# Patient Record
Sex: FEMALE | Race: BLACK OR AFRICAN AMERICAN | NOT HISPANIC OR LATINO | Employment: PART TIME | URBAN - METROPOLITAN AREA
[De-identification: names, ages, dates, MRNs, and addresses within clinical notes are randomized per-mention and may not be internally consistent; named-entity substitution may affect disease eponyms.]

---

## 2018-04-16 ENCOUNTER — TELEPHONE (OUTPATIENT)
Dept: OBSTETRICS AND GYNECOLOGY | Facility: CLINIC | Age: 43
End: 2018-04-16

## 2018-04-16 NOTE — TELEPHONE ENCOUNTER
Returned pt call and informed pt that she can come to her appt while on cycle. Pt verbalized understanding

## 2018-04-16 NOTE — TELEPHONE ENCOUNTER
----- Message from Irina Farooq sent at 4/16/2018  9:41 AM CDT -----  Contact: pt            Name of Who is Calling: pt      What is the request in detail: pt is stating she has a appointment on tomorrow and her menstrual cycle came over the weekend. Pt is needing to know can she still be seen      Can the clinic reply by [facility]: no      What Number to Call Back if not in [facility]: [phone]

## 2018-04-17 ENCOUNTER — OFFICE VISIT (OUTPATIENT)
Dept: OBSTETRICS AND GYNECOLOGY | Facility: CLINIC | Age: 43
End: 2018-04-17
Payer: MEDICAID

## 2018-04-17 ENCOUNTER — TELEPHONE (OUTPATIENT)
Dept: OBSTETRICS AND GYNECOLOGY | Facility: CLINIC | Age: 43
End: 2018-04-17

## 2018-04-17 VITALS
SYSTOLIC BLOOD PRESSURE: 132 MMHG | WEIGHT: 160.94 LBS | HEIGHT: 64 IN | BODY MASS INDEX: 27.48 KG/M2 | DIASTOLIC BLOOD PRESSURE: 88 MMHG

## 2018-04-17 DIAGNOSIS — R10.2 FEMALE PELVIC PAIN: ICD-10-CM

## 2018-04-17 DIAGNOSIS — D25.9 UTERINE LEIOMYOMA, UNSPECIFIED LOCATION: ICD-10-CM

## 2018-04-17 DIAGNOSIS — N92.0 MENORRHAGIA WITH REGULAR CYCLE: Primary | ICD-10-CM

## 2018-04-17 LAB
B-HCG UR QL: NEGATIVE
CTP QC/QA: YES

## 2018-04-17 PROCEDURE — 99213 OFFICE O/P EST LOW 20 MIN: CPT | Mod: PBBFAC,PN | Performed by: OBSTETRICS & GYNECOLOGY

## 2018-04-17 PROCEDURE — 99203 OFFICE O/P NEW LOW 30 MIN: CPT | Mod: S$PBB,,, | Performed by: OBSTETRICS & GYNECOLOGY

## 2018-04-17 PROCEDURE — 81025 URINE PREGNANCY TEST: CPT | Mod: PBBFAC,PN | Performed by: OBSTETRICS & GYNECOLOGY

## 2018-04-17 PROCEDURE — 99999 PR PBB SHADOW E&M-EST. PATIENT-LVL III: CPT | Mod: PBBFAC,,, | Performed by: OBSTETRICS & GYNECOLOGY

## 2018-04-17 NOTE — TELEPHONE ENCOUNTER
----- Message from Elzbieta Mendoza sent at 4/17/2018  1:13 PM CDT -----            Name of Who is Calling: jimmie      What is the request in detail: pt. Wants to know is there any other options besides getting hysterectomy. Please call to discuss      Can the clinic reply by [facility]: no      [facility]: [phone]

## 2018-04-17 NOTE — PROGRESS NOTES
Chief Complaint   Patient presents with    Vaginal Bleeding     irregular menstrual       HPI:  43 y.o. female  presents as a new patient    Patient's last menstrual period was 2018.    - Long history of heavy cycles  - Recently required a blood transfusion due to anemia (Hct=23.4) associated with her cycle  - Denies irregular bleeding  - 2017: u/s demonstrated a [id number] uterus with multiple uterine fibroids  - Patient also describes bloating, constipation, and increased urinary frequency    Contraception: None  Pap: No result found, No recent documented pap  Mammogram: No recent documented mamogram    Past Medical History:   Diagnosis Date    Anxiety     Depression     Heart murmur     Hypertension      Past Surgical History:   Procedure Laterality Date     SECTION  ,        Social History   Substance Use Topics    Smoking status: Current Every Day Smoker     Packs/day: 0.50     Years: 15.00     Types: Cigarettes    Smokeless tobacco: Never Used    Alcohol use Yes      Comment: Occassions only      Family History   Problem Relation Age of Onset    Diabetes Mother     Hyperlipidemia Maternal Grandmother     Diabetes Maternal Grandmother     Breast cancer Neg Hx     Colon cancer Neg Hx     Ovarian cancer Neg Hx      OB History    Para Term  AB Living   2 2 2     2   SAB TAB Ectopic Multiple Live Births                  # Outcome Date GA Lbr Edmund/2nd Weight Sex Delivery Anes PTL Lv   2 Term            1 Term                   MEDICATIONS: Reviewed with patient.  ALLERGIES: Patient has no known allergies.     ROS:  Review of Systems   Constitutional: Negative for fever.   Respiratory: Negative for shortness of breath.    Cardiovascular: Negative for chest pain.   Gastrointestinal: Positive for bloating and constipation. Negative for abdominal pain, nausea and vomiting.   Genitourinary: Positive for frequency, menorrhagia and pelvic pain. Negative for  "menstrual problem.   Neurological: Negative for headaches.       PHYSICAL EXAM:    /88   Ht 5' 4" (1.626 m)   Wt 73 kg (160 lb 15 oz)   LMP 03/26/2018   BMI 27.62 kg/m²     Physical Exam:   Constitutional: She is oriented to person, place, and time. She appears well-developed.    HENT:   Head: Normocephalic.       Pulmonary/Chest: Effort normal.        Abdominal: She exhibits mass. There is no hepatosplenomegaly. There is no tenderness.   Large abdominopelvic mass palpable to the umbilicus                 Neurological: She is alert and oriented to person, place, and time.     Psychiatric: She has a normal mood and affect.         ASSESSMENT & PLAN:   Menorrhagia with regular cycle  -     POCT Urine Pregnancy    Uterine leiomyoma, unspecified location    Female pelvic pain      - Large abdominopelvic mass on exam; palpates large than expected from u/s    - Patient counseled on fibroids including prevalence, natural history, and associated symptoms such as heavy menstrual bleeding, irregular bleeding, pain, bloating, and constipation  - Patient counseled on management options for fibroids including   -- Medical management with NSAIDs, combined hormonal contraception such as DepoProvera injections or the Mirena IUD   -- Surgical management with uterine artery embolization or hysterectomy    - Return to clinic for pap and endometrial biopsy    Total visit time was 30 minutes with greater than 50% of time dedicated to counseling.      "

## 2018-04-20 ENCOUNTER — TELEPHONE (OUTPATIENT)
Dept: OBSTETRICS AND GYNECOLOGY | Facility: CLINIC | Age: 43
End: 2018-04-20

## 2018-04-20 NOTE — TELEPHONE ENCOUNTER
Returned pt call and informed pt that she have a follow up visit on 4/24/2018 and that she can discuss other options with  at her visit. Pt verbalized understanding

## 2018-04-24 ENCOUNTER — PROCEDURE VISIT (OUTPATIENT)
Dept: OBSTETRICS AND GYNECOLOGY | Facility: CLINIC | Age: 43
End: 2018-04-24
Payer: MEDICAID

## 2018-04-24 ENCOUNTER — TELEPHONE (OUTPATIENT)
Dept: OBSTETRICS AND GYNECOLOGY | Facility: CLINIC | Age: 43
End: 2018-04-24

## 2018-04-24 VITALS
DIASTOLIC BLOOD PRESSURE: 86 MMHG | HEIGHT: 64 IN | BODY MASS INDEX: 27.36 KG/M2 | SYSTOLIC BLOOD PRESSURE: 140 MMHG | WEIGHT: 160.25 LBS

## 2018-04-24 DIAGNOSIS — Z11.51 ENCOUNTER FOR SCREENING FOR HUMAN PAPILLOMAVIRUS (HPV): ICD-10-CM

## 2018-04-24 DIAGNOSIS — D25.9 UTERINE LEIOMYOMA, UNSPECIFIED LOCATION: ICD-10-CM

## 2018-04-24 DIAGNOSIS — Z12.4 ENCOUNTER FOR SCREENING FOR MALIGNANT NEOPLASM OF CERVIX: ICD-10-CM

## 2018-04-24 DIAGNOSIS — N92.1 MENORRHAGIA WITH IRREGULAR CYCLE: Primary | ICD-10-CM

## 2018-04-24 LAB
B-HCG UR QL: NEGATIVE
CTP QC/QA: YES

## 2018-04-24 PROCEDURE — 88175 CYTOPATH C/V AUTO FLUID REDO: CPT

## 2018-04-24 PROCEDURE — 81025 URINE PREGNANCY TEST: CPT | Mod: PBBFAC,PN | Performed by: OBSTETRICS & GYNECOLOGY

## 2018-04-24 PROCEDURE — 88305 TISSUE EXAM BY PATHOLOGIST: CPT | Performed by: PATHOLOGY

## 2018-04-24 PROCEDURE — 58100 BIOPSY OF UTERUS LINING: CPT | Mod: PBBFAC,PN | Performed by: OBSTETRICS & GYNECOLOGY

## 2018-04-24 PROCEDURE — 99214 OFFICE O/P EST MOD 30 MIN: CPT | Mod: 25,S$PBB,, | Performed by: OBSTETRICS & GYNECOLOGY

## 2018-04-24 PROCEDURE — 88305 TISSUE EXAM BY PATHOLOGIST: CPT | Mod: 26,,, | Performed by: PATHOLOGY

## 2018-04-24 PROCEDURE — 87624 HPV HI-RISK TYP POOLED RSLT: CPT

## 2018-04-24 NOTE — PROCEDURES
Endometrial biopsy  Date/Time: 4/24/2018 3:19 PM  Performed by: EVERETT VILLA  Authorized by: EVERETT VILLA   Local anesthesia used: no    Anesthesia:  Local anesthesia used: no  Patient tolerance: Patient tolerated the procedure well with no immediate complications          Patient counseled on need for procedure to evaluate for endometrial hyperplasia.  Patient counseled on risks of procedure including pain, bleeding, and infections.  Patient acknowledges risks and wishes to proceed.  All questions answered.  Consents signed.    Cervix visualized with speculum.  Cervix prepped with povidine iodine.  Tenaculum applied to anterior aspect of cervix.  Adequate sample obtained with 2 passes of the endometrial pipelle.  Tenaculum removed.  Hemostasis achieved with pressure.  Procedure tolerated well.

## 2018-04-24 NOTE — PROGRESS NOTES
"Chief Complaint   Patient presents with    Procedure     embx       HPI:  43 y.o. female     Patient's last menstrual period was 2018.     - Patient with history of heavy cycles and fibroids  - She returns for endometrial biopsy, pap, and further discuss management options      Pap: No result found, No recent documented pap  Mammogram: /, BIRADS1    Past Medical History:   Diagnosis Date    Anxiety     Depression     Heart murmur     Hypertension      Past Surgical History:   Procedure Laterality Date     SECTION  ,        Social History   Substance Use Topics    Smoking status: Current Every Day Smoker     Packs/day: 0.50     Years: 15.00     Types: Cigarettes    Smokeless tobacco: Never Used    Alcohol use Yes      Comment: Occassions only      Family History   Problem Relation Age of Onset    Diabetes Mother     Hyperlipidemia Maternal Grandmother     Diabetes Maternal Grandmother     Breast cancer Neg Hx     Colon cancer Neg Hx     Ovarian cancer Neg Hx      OB History    Para Term  AB Living   2 2 2     2   SAB TAB Ectopic Multiple Live Births                  # Outcome Date GA Lbr Edmund/2nd Weight Sex Delivery Anes PTL Lv   2 Term            1 Term                   MEDICATIONS: Reviewed with patient.  ALLERGIES: Patient has no known allergies.     ROS:  Review of Systems   Constitutional: Negative for fever.   Respiratory: Negative for shortness of breath.    Cardiovascular: Negative for chest pain.   Gastrointestinal: Positive for bloating and constipation. Negative for abdominal pain, nausea and vomiting.   Genitourinary: Positive for frequency, menorrhagia and pelvic pain. Negative for menstrual problem.   Neurological: Negative for headaches.       PHYSICAL EXAM:    BP (!) 140/86   Ht 5' 4" (1.626 m)   Wt 72.7 kg (160 lb 4.4 oz)   LMP 2018   BMI 27.51 kg/m²     Physical Exam:   Constitutional: She is oriented to person, place, and " time. She appears well-developed.    HENT:   Head: Normocephalic.       Pulmonary/Chest: Effort normal.        Abdominal: She exhibits no mass. There is no hepatosplenomegaly. There is no tenderness.     Genitourinary: Vagina normal. Uterus is enlarged. Uterus is not tender. Cervix is normal. Right adnexum displays no tenderness and no fullness. Left adnexum displays no tenderness and no fullness. No vaginal discharge found. Additional cervical findings: pap smear done  Genitourinary Comments: External genitalia: Normal  Urethra: No tenderness; normal meatus  Bladder: No tenderness  Bimanual exam: Large abdominopelvic mass palpable to umbilicus               Neurological: She is alert and oriented to person, place, and time.     Psychiatric: She has a normal mood and affect.         ASSESSMENT & PLAN:   Menorrhagia with irregular cycle  -     POCT Urine Pregnancy  -     Endometrial biopsy  -     Tissue Specimen To Pathology, Obstetrics/Gynecology    Uterine leiomyoma, unspecified location  -     Endometrial biopsy  -     Tissue Specimen To Pathology, Obstetrics/Gynecology    Encounter for screening for malignant neoplasm of cervix  -     Liquid-based pap smear, screening    Encounter for screening for human papillomavirus (HPV)  -     HPV High Risk Genotypes, PCR      - See separate procedure note for endometrial biopsy    - Pap obtained    - Patient again counseled on fibroids including prevalence, natural history, and associated symptoms such as heavy menstrual bleeding, irregular bleeding, pain, bloating, and constipation  - Patient again counseled on management options for fibroids including   -- Medical management with NSAIDs, combined hormonal contraception such as DepoProvera injections or the Mirena IUD   -- Patient counseled that medical management would help with bleeding but is unlikely to significantly improve her bulk related symptoms   -- Surgical management with uterine artery embolization or  hysterectomy    - Patient will discuss management options with her , but she is leaning toward having a hysterectomy  - Patient counseled on surgical approach (abdominal) and recovery time  - Return to clinic in 2 weeks for preOP appointment    Total visit time was 25 minutes with greater than 50% of time dedicated to counseling.

## 2018-04-24 NOTE — TELEPHONE ENCOUNTER
----- Message from Wandy Rossi sent at 4/24/2018  3:01 PM CDT -----  Contact: CHAYO RENE [[id number]]  Name of Who is Calling: CHAYO RENE [[id number]]      What is the request in detail: Patient states she would like to know if it is still necessary for her to have the MRI.       Can the clinic reply by [facility]:  No      What Number to Call Back if not in [facility]: [phone]

## 2018-04-24 NOTE — TELEPHONE ENCOUNTER
Returned pt call and pt wanted to know if she still need a MRI. As per  he stated that she does not need one. Pt verbalized understanding

## 2018-04-30 LAB
HPV16 AG SPEC QL: NEGATIVE
HPV16+18+H RISK 12 DNA CVX-IMP: NEGATIVE
HPV18 DNA SPEC QL NAA+PROBE: NEGATIVE

## 2018-05-04 PROBLEM — D64.9 ANEMIA: Chronic | Status: ACTIVE | Noted: 2018-05-04

## 2018-05-04 PROBLEM — D58.9 HEMOLYTIC ANEMIA: Chronic | Status: RESOLVED | Noted: 2018-05-04 | Resolved: 2018-05-04

## 2018-05-04 PROBLEM — Z72.0 TOBACCO ABUSE DISORDER: Chronic | Status: ACTIVE | Noted: 2018-05-04

## 2018-05-04 PROBLEM — D58.9 HEMOLYTIC ANEMIA: Chronic | Status: ACTIVE | Noted: 2018-05-04

## 2018-05-07 ENCOUNTER — TELEPHONE (OUTPATIENT)
Dept: OBSTETRICS AND GYNECOLOGY | Facility: CLINIC | Age: 43
End: 2018-05-07

## 2018-05-07 NOTE — LETTER
May 7, 2018    Mercedez Soni  [address] [address]             [facility] [address]  [address] [place] [address]  [address]  [facility] [address]  Phone: [phone]  Fax: [phone] Dear Ms. Soni:    The results of your most recent Pap smear are normal. This means that no cancerous or precancerous cells were seen. We recommend that you come back in 1 year for your annual exam and 3 years for your next Pap smear. Endometrial biopsy was normal.    If you have any questions or concerns, please don't hesitate to call.    Sincerely,        Nickolas Zuleta MD

## 2018-05-08 ENCOUNTER — OFFICE VISIT (OUTPATIENT)
Dept: OBSTETRICS AND GYNECOLOGY | Facility: CLINIC | Age: 43
End: 2018-05-08
Payer: MEDICAID

## 2018-05-08 ENCOUNTER — TELEPHONE (OUTPATIENT)
Dept: OBSTETRICS AND GYNECOLOGY | Facility: CLINIC | Age: 43
End: 2018-05-08

## 2018-05-08 VITALS
HEIGHT: 64 IN | SYSTOLIC BLOOD PRESSURE: 134 MMHG | DIASTOLIC BLOOD PRESSURE: 100 MMHG | WEIGHT: 161.69 LBS | BODY MASS INDEX: 27.6 KG/M2

## 2018-05-08 DIAGNOSIS — N92.1 MENORRHAGIA WITH IRREGULAR CYCLE: ICD-10-CM

## 2018-05-08 DIAGNOSIS — D25.9 UTERINE LEIOMYOMA, UNSPECIFIED LOCATION: Primary | ICD-10-CM

## 2018-05-08 PROCEDURE — 99999 PR PBB SHADOW E&M-EST. PATIENT-LVL III: CPT | Mod: PBBFAC,,, | Performed by: OBSTETRICS & GYNECOLOGY

## 2018-05-08 PROCEDURE — 99214 OFFICE O/P EST MOD 30 MIN: CPT | Mod: S$PBB,,, | Performed by: OBSTETRICS & GYNECOLOGY

## 2018-05-08 PROCEDURE — 99213 OFFICE O/P EST LOW 20 MIN: CPT | Mod: PBBFAC,PN | Performed by: OBSTETRICS & GYNECOLOGY

## 2018-05-08 NOTE — TELEPHONE ENCOUNTER
----- Message from Justyna Burns sent at 5/8/2018  1:39 PM CDT -----  Contact: self [phone]  Her  was not able to come in for the appt at 2:15 today, she said he was the reason for the visit.  So she is wondering if you could print up detailed information for him about the surgery. And other options and if they would shrink with the shot?  Please let her know when she can pick it up.  Thank you!

## 2018-05-08 NOTE — PROGRESS NOTES
Chief Complaint   Patient presents with    Consult       HPI:  43 y.o. female     Patient's last menstrual period was 2018.     - Patient with history of heavy cycles and fibroids    - Exam previously demonstrated a large abdominopelvic mass to the pt's umbilicus  - 2018: MRI demonstrates a 24.5-cm uterus with multiple fibroids, largest measuring 16-cm  - 2018: endometrial biopsy negative for hyperplasia or malignancy    - Patient returns with her  to discuss management options    Pap: 2018, NILM/HPV(-)  Mammogram: /, BIRADS1    Past Medical History:   Diagnosis Date    Anxiety     Depression     Heart murmur     Hemolytic anemia 2018    Hypertension      Past Surgical History:   Procedure Laterality Date     SECTION  ,        Social History   Substance Use Topics    Smoking status: Current Every Day Smoker     Packs/day: 0.50     Years: 15.00     Types: Cigarettes    Smokeless tobacco: Never Used    Alcohol use Yes      Comment: Occassions only      Family History   Problem Relation Age of Onset    Diabetes Mother     Hyperlipidemia Maternal Grandmother     Diabetes Maternal Grandmother     Breast cancer Neg Hx     Colon cancer Neg Hx     Ovarian cancer Neg Hx      OB History    Para Term  AB Living   2 2 2     2   SAB TAB Ectopic Multiple Live Births                  # Outcome Date GA Lbr Edmund/2nd Weight Sex Delivery Anes PTL Lv   2 Term            1 Term                   MEDICATIONS: Reviewed with patient.  ALLERGIES: Patient has no known allergies.     ROS:  Review of Systems   Constitutional: Negative for fever.   Respiratory: Negative for shortness of breath.    Cardiovascular: Negative for chest pain.   Gastrointestinal: Positive for bloating and constipation. Negative for abdominal pain, nausea and vomiting.   Genitourinary: Positive for frequency, menorrhagia and pelvic pain. Negative for menstrual problem.  "  Neurological: Negative for headaches.       PHYSICAL EXAM:    BP (!) 134/100   Ht 5' 4" (1.626 m)   Wt 73.4 kg (161 lb 11.3 oz)   LMP 05/07/2018   BMI 27.76 kg/m²     Physical Exam:   Constitutional: She is oriented to person, place, and time. She appears well-developed.    HENT:   Head: Normocephalic.       Pulmonary/Chest: Effort normal.                      Neurological: She is alert and oriented to person, place, and time.     Psychiatric: She has a normal mood and affect.         ASSESSMENT & PLAN:   Uterine leiomyoma, unspecified location    Menorrhagia with irregular cycle          - Patient again counseled on fibroids including prevalence, natural history, and associated symptoms such as heavy menstrual bleeding, irregular bleeding, pain, bloating, and constipation  - Patient again counseled on management options for fibroids including   -- Medical management with NSAIDs, combined hormonal contraception such as DepoProvera injections or the Mirena IUD   -- Patient counseled that medical management would help with bleeding but is unlikely to significantly improve her bulk related symptoms   -- Surgical management with uterine artery embolization or hysterectomy    - Patient will discuss management options with her , but she is leaning toward having a hysterectomy  - Patient will notify clinic when she decides on a management plan    Total visit time was 25 minutes with greater than 50% of time dedicated to counseling.      "

## 2018-05-11 ENCOUNTER — TELEPHONE (OUTPATIENT)
Dept: OBSTETRICS AND GYNECOLOGY | Facility: CLINIC | Age: 43
End: 2018-05-11

## 2018-05-11 NOTE — TELEPHONE ENCOUNTER
----- Message from Irina Farooq sent at 5/10/2018  2:26 PM CDT -----  Contact: pt            Name of Who is Calling: pt      What is the request in detail: pt is calling in regards to discuss scheduling a surgery date      Can the clinic reply by [facility]: no      What Number to Call Back if not in [facility]: [phone]

## 2018-05-18 ENCOUNTER — TELEPHONE (OUTPATIENT)
Dept: OBSTETRICS AND GYNECOLOGY | Facility: CLINIC | Age: 43
End: 2018-05-18

## 2018-05-18 NOTE — TELEPHONE ENCOUNTER
----- Message from Irina aFrooq sent at 5/18/2018 10:21 AM CDT -----  Contact: pt            Name of Who is Calling: pt      What is the request in detail: pt returning a call      Can the clinic reply by [facility]: no      [facility]: [phone]

## 2018-05-18 NOTE — TELEPHONE ENCOUNTER
----- Message from Kelton Madison sent at 5/17/2018  2:52 PM CDT -----  Contact: CHAYO RENE [[id number]]  Name of Who is Calling: CHAYO RENE [[id number]]      What is the request in detail: Patient would like to follow up on if surgery date was decided on.       Can the clinic reply by [facility]: no      [facility]: [phone]

## 2018-06-05 ENCOUNTER — OFFICE VISIT (OUTPATIENT)
Dept: OBSTETRICS AND GYNECOLOGY | Facility: CLINIC | Age: 43
End: 2018-06-05
Payer: MEDICAID

## 2018-06-05 VITALS
BODY MASS INDEX: 27.74 KG/M2 | DIASTOLIC BLOOD PRESSURE: 96 MMHG | HEIGHT: 64 IN | WEIGHT: 162.5 LBS | SYSTOLIC BLOOD PRESSURE: 150 MMHG

## 2018-06-05 DIAGNOSIS — N92.1 MENORRHAGIA WITH IRREGULAR CYCLE: ICD-10-CM

## 2018-06-05 DIAGNOSIS — D25.9 UTERINE LEIOMYOMA, UNSPECIFIED LOCATION: Primary | ICD-10-CM

## 2018-06-05 DIAGNOSIS — R10.2 FEMALE PELVIC PAIN: ICD-10-CM

## 2018-06-05 PROCEDURE — 99213 OFFICE O/P EST LOW 20 MIN: CPT | Mod: PBBFAC,PN | Performed by: OBSTETRICS & GYNECOLOGY

## 2018-06-05 PROCEDURE — 99499 UNLISTED E&M SERVICE: CPT | Mod: S$PBB,,, | Performed by: OBSTETRICS & GYNECOLOGY

## 2018-06-05 PROCEDURE — 99999 PR PBB SHADOW E&M-EST. PATIENT-LVL III: CPT | Mod: PBBFAC,,, | Performed by: OBSTETRICS & GYNECOLOGY

## 2018-06-05 NOTE — PROGRESS NOTES
Chief Complaint   Patient presents with    Consult     discuss surgery       HPI:  43 y.o. female     Patient's last menstrual period was 2018.     - Patient returns for pre-operative appointment  - She desires hysterectomy for large uterine fibroids with associated pain, bloating, and heavy/irregular bleeding    - Exam previously demonstrated a large abdominopelvic mass to the pt's umbilicus  - 2018: MRI demonstrates a 24.5-cm uterus with multiple fibroids, largest measuring 16-cm  - 2018: endometrial biopsy negative for hyperplasia or malignancy      Pap: 2018, NILM/HPV(-)  Mammogram: /, BIRADS1    Past Medical History:   Diagnosis Date    Anxiety     Depression     Heart murmur     Hemolytic anemia 2018    Hypertension      Past Surgical History:   Procedure Laterality Date     SECTION  ,        Social History   Substance Use Topics    Smoking status: Current Every Day Smoker     Packs/day: 0.50     Years: 15.00     Types: Cigarettes    Smokeless tobacco: Never Used    Alcohol use Yes      Comment: Occassions only      Family History   Problem Relation Age of Onset    Diabetes Mother     Hyperlipidemia Maternal Grandmother     Diabetes Maternal Grandmother     Breast cancer Neg Hx     Colon cancer Neg Hx     Ovarian cancer Neg Hx      OB History    Para Term  AB Living   2 2 2     2   SAB TAB Ectopic Multiple Live Births                  # Outcome Date GA Lbr Edmund/2nd Weight Sex Delivery Anes PTL Lv   2 Term            1 Term                   MEDICATIONS: Reviewed with patient.  ALLERGIES: Patient has no known allergies.     ROS:  Review of Systems   Constitutional: Negative for fever.   Respiratory: Negative for shortness of breath.    Cardiovascular: Negative for chest pain.   Gastrointestinal: Positive for bloating and constipation. Negative for abdominal pain, nausea and vomiting.   Genitourinary: Positive for frequency,  "menorrhagia and pelvic pain. Negative for menstrual problem.   Neurological: Negative for headaches.       PHYSICAL EXAM:    BP (!) 150/96   Ht 5' 4" (1.626 m)   Wt 73.7 kg (162 lb 7.7 oz)   LMP 05/07/2018   BMI 27.89 kg/m²     Physical Exam:   Constitutional: She is oriented to person, place, and time. She appears well-developed.    HENT:   Head: Normocephalic.       Pulmonary/Chest: Effort normal.        Abdominal: She exhibits mass. There is no tenderness.                 Neurological: She is alert and oriented to person, place, and time.     Psychiatric: She has a normal mood and affect.         ASSESSMENT & PLAN:   Uterine leiomyoma, unspecified location  -     Case Request Operating Room: HYSTERECTOMY, TOTAL, ABDOMINAL    Menorrhagia with irregular cycle  -     Case Request Operating Room: HYSTERECTOMY, TOTAL, ABDOMINAL    Female pelvic pain  -     Case Request Operating Room: HYSTERECTOMY, TOTAL, ABDOMINAL      - Abdominal hysterectomy scheduled for 7/2/2018 at 0930    - Patient counseled on surgical approach.  Given uterine size, will proceed with abdominal hysterectomy.  - Patient counseled on ovarian preservation.  Patient counseled, though, that if one or both ovaries look abnormal at time of surgery or if the surgery can no be completed safely without removing one or both ovaries, then one or both ovaries may be removed at the time of surgery  - Patient counseled on the risks of hysterectomy including   -- Pain and post-operative management of pain   -- Bleeding and possible management for operative or post-operative bleeding   -- Infection and possible management for post-operative infections   -- Surgical injury, especially to bowel, bladder, or ureters and possible operative or post-operative management   -- Anesthesia   -- Possible blood transfusion including risks of reaction and infection   -- DVT   -- Death  - Patient acknowledges risks and wishes to proceed with surgery.  All questions " answered.  Consents signed.  - Will schedule patient for pre-anesthesia appt.  - Patient instructed   -- To be NPO after midnight on the evening prior to surgery   -- To arrive at the hospital 2 hours prior to her scheduled case

## 2018-06-26 ENCOUNTER — ANESTHESIA EVENT (OUTPATIENT)
Dept: SURGERY | Facility: OTHER | Age: 43
End: 2018-06-26

## 2018-06-26 ENCOUNTER — HOSPITAL ENCOUNTER (OUTPATIENT)
Dept: PREADMISSION TESTING | Facility: OTHER | Age: 43
Discharge: HOME | End: 2018-06-26
Attending: OBSTETRICS & GYNECOLOGY
Payer: MEDICAID

## 2018-06-26 VITALS
WEIGHT: 168 LBS | OXYGEN SATURATION: 100 % | HEIGHT: 64 IN | DIASTOLIC BLOOD PRESSURE: 81 MMHG | BODY MASS INDEX: 28.68 KG/M2 | TEMPERATURE: 99 F | SYSTOLIC BLOOD PRESSURE: 127 MMHG | HEART RATE: 77 BPM

## 2018-06-26 LAB
ABO + RH BLD: NORMAL
BLD GP AB SCN CELLS X3 SERPL QL: NORMAL

## 2018-06-26 PROCEDURE — 86901 BLOOD TYPING SEROLOGIC RH(D): CPT

## 2018-06-26 PROCEDURE — 36415 COLL VENOUS BLD VENIPUNCTURE: CPT

## 2018-06-26 RX ORDER — ALBUTEROL SULFATE 0.83 MG/ML
2.5 SOLUTION RESPIRATORY (INHALATION)
Status: CANCELLED | OUTPATIENT
Start: 2018-06-26 | End: 2018-06-26

## 2018-06-26 RX ORDER — SODIUM CHLORIDE, SODIUM LACTATE, POTASSIUM CHLORIDE, CALCIUM CHLORIDE 600; 310; 30; 20 MG/100ML; MG/100ML; MG/100ML; MG/100ML
INJECTION, SOLUTION INTRAVENOUS CONTINUOUS
Status: CANCELLED | OUTPATIENT
Start: 2018-06-26

## 2018-06-26 RX ORDER — PREGABALIN 75 MG/1
150 CAPSULE ORAL
Status: ACTIVE | OUTPATIENT
Start: 2018-06-26 | End: 2018-06-27

## 2018-06-26 RX ORDER — FAMOTIDINE 20 MG/1
20 TABLET, FILM COATED ORAL
Status: CANCELLED | OUTPATIENT
Start: 2018-06-26 | End: 2018-06-26

## 2018-06-26 NOTE — DISCHARGE INSTRUCTIONS
PRE-ADMIT TESTING -  [phone]    [address]  [place] [facility]          Your surgery has been scheduled at [facility]. We are pleased to have the opportunity to serve you. For Further Information please call [phone].    On the day of surgery please report to the Information Desk on the 1st floor.    · CONTACT YOUR PHYSICIAN'S OFFICE THE DAY PRIOR TO YOUR SURGERY TO OBTAIN YOUR ARRIVAL TIME.     · The evening before surgery do not eat anything after 9 p.m. ( this includes hard candy, chewing gum and mints).  You may only have GATORADE, POWERADE AND WATER  from 9 p.m. until you leave your home.   DO NOT DRINK ANY LIQUIDS ON THE WAY TO THE HOSPITAL.      SPECIAL MEDICATION INSTRUCTIONS: TAKE medications checked off by the Anesthesiologist on your Medication List.    Angiogram Patients: Take medications as instructed by your physician, including aspirin.     Surgery Patients:    If you take ASPIRIN - Your PHYSICIAN/SURGEON will need to inform you IF/OR when you need to stop taking aspirin prior to your surgery.     Do Not take any medications containing IBUPROFEN.  Do Not Wear any make-up or dark nail polish   (especially eye make-up) to surgery. If you come to surgery with makeup on you will be required to remove the makeup or nail polish.    Do not shave your surgical area at least 5 days prior to your surgery. The surgical prep will be performed at the hospital according to Infection Control regulations.    Leave all valuables at home.   Do Not wear any jewelry or watches, including any metal in body piercings.  Contact Lens must be removed before surgery. Either do not wear the contact lens or bring a case and solution for storage.  Please bring a container for eyeglasses or dentures as required.  Bring any paperwork your physician has provided, such as consent forms,  history and physicals, doctor's orders, etc.   Bring comfortable clothes that are loose fitting to wear upon  discharge. Take into consideration the type of surgery being performed.  Maintain your diet as advised per your physician the day prior to surgery.      Adequate rest the night before surgery is advised.   Park in the Parking lot behind the hospital or in the [facility] across the street from the parking lot. Parking is complimentary.  If you will be discharged the same day as your procedure, please arrange for a responsible adult to drive you home or to accompany you if traveling by taxi.   YOU WILL NOT BE PERMITTED TO DRIVE OR TO LEAVE THE HOSPITAL ALONE AFTER SURGERY.   It is strongly recommended that you arrange for someone to remain with you for the first 24 hrs following your surgery.       Thank you for your cooperation.  The Staff of [facility].        Bathing Instructions                                                                 Please shower the evening before and morning of your procedure with    ANTIBACTERIAL SOAP. ( DIAL, etc )  Concentrate on the surgical area   for at least 3 minutes and rinse completely. Dry off as usual.   Do not use any deodorant, powder, body lotions, perfume, after shave or    cologne.

## 2018-06-26 NOTE — ANESTHESIA PREPROCEDURE EVALUATION
06/26/2018  Mercedez Soni is a 43 y.o., female.    Pre-op Assessment    I have reviewed the Patient Summary Reports.     I have reviewed the Nursing Notes.   I have reviewed the Medications.     Review of Systems  Anesthesia Hx:  No problems with previous Anesthesia  Denies Family Hx of Anesthesia complications.   Denies Personal Hx of Anesthesia complications.   Social:  Smoker    Hematology/Oncology:     Oncology Normal    -- Anemia:   Cardiovascular:   Hypertension    Pulmonary:  Pulmonary Normal    Renal/:  Renal/ Normal     Hepatic/GI:  Hepatic/GI Normal    Musculoskeletal:  Musculoskeletal Normal    Neurological:  Neurology Normal    Endocrine:   Hypothyroidism        Physical Exam  General:  Well nourished    Airway/Jaw/Neck:  Airway Findings: Mouth Opening: Normal Tongue: Normal  General Airway Assessment: Adult  Mallampati: I  TM Distance: Normal, at least 6 cm         Dental:  Dental Findings:              Anesthesia Plan  Type of Anesthesia, risks & benefits discussed:  Anesthesia Type:  general  Patient's Preference:   Intra-op Monitoring Plan: standard ASA monitors  Intra-op Monitoring Plan Comments:   Post Op Pain Control Plan: multimodal analgesia  Post Op Pain Control Plan Comments:   Induction:   IV  Beta Blocker:         Informed Consent: Patient understands risks and agrees with Anesthesia plan.  Questions answered. Anesthesia consent signed with patient.  ASA Score: 2     Day of Surgery Review of History & Physical:    H&P update referred to the surgeon.

## 2018-06-29 ENCOUNTER — TELEPHONE (OUTPATIENT)
Dept: OBSTETRICS AND GYNECOLOGY | Facility: CLINIC | Age: 43
End: 2018-06-29

## 2018-06-29 NOTE — TELEPHONE ENCOUNTER
----- Message from Fadia Freeman sent at 6/29/2018 10:31 AM CDT -----  Regarding: Pending pt    MRN #[id number] surgery is not cleared for ­­­­­07/02/2018 In the event the procedure is not cleared is the surgery deemed Medically Urgent (the MD will carry on with surgery without the authorization)?     If the surgery is deemed Non Medically Urgent will the MD cancel or reschedule the surgery until authorization has been obtained?     Please advise.    Thanks,  Fadia Freeman   P#[phone]

## 2018-07-02 ENCOUNTER — ANESTHESIA (OUTPATIENT)
Dept: SURGERY | Facility: OTHER | Age: 43
End: 2018-07-02

## 2018-07-02 ENCOUNTER — TELEPHONE (OUTPATIENT)
Dept: OBSTETRICS AND GYNECOLOGY | Facility: HOSPITAL | Age: 43
End: 2018-07-02

## 2018-07-02 NOTE — TELEPHONE ENCOUNTER
Spoke with Dr. Zuleta regarding ENA planned for today. Insurance did not approve procedure, needs to be cancelled for today.    Patient notified via phone and instructed that someone from Dr. Zuleta's office will contact her about rescheduling.    Noelle Campbell MD  OBGYN - PGY 3

## 2018-07-03 ENCOUNTER — TELEPHONE (OUTPATIENT)
Dept: OBSTETRICS AND GYNECOLOGY | Facility: CLINIC | Age: 43
End: 2018-07-03

## 2018-07-03 NOTE — TELEPHONE ENCOUNTER
Called pt and informed pt that a new surgery date is set for her on 7/30/2018 at 1pm. Pt stated she will call back to confirm if that is a good day and time for her.

## 2018-07-03 NOTE — TELEPHONE ENCOUNTER
----- Message from Naty Yarbrough MA sent at 7/2/2018  4:45 PM CDT -----    Mercedez Soni   Female, 43 y.o., 1975  Weight:   76.2 kg (168 lb)  Phone:   #M:[phone]; H:[phone]  PCP:   Abelardo Dixon MD  Language:   English  Need Interp:   No  Allergies:   No Known Allergies  Health Maintenance:   Due  Primary Ins.:   MEDICAID  MRN:   [id number]  Pt Comm Pref:   None  Patient Portal:   Active  Next Appt:   None  My Sticky Note:         Patient Calls     Noelle Campbell MD routed conversation to [facility] Staff 8 hours ago (8:21 AM)    MD Mercedez Joseph 8 hours ago (8:19 AM)    Noelle Campbell MD 8 hours ago (8:19 AM)          Spoke with Dr. Zuleta regarding ENA planned for today. Insurance did not approve procedure, needs to be cancelled for today.     Patient notified via phone and instructed that someone from Dr. Zuleta's office will contact her about rescheduling.     Noelle Campbell MD  OBGYN - PGY 3       Documentation

## 2018-07-05 ENCOUNTER — TELEPHONE (OUTPATIENT)
Dept: OBSTETRICS AND GYNECOLOGY | Facility: CLINIC | Age: 43
End: 2018-07-05

## 2018-07-05 DIAGNOSIS — D25.9 UTERINE LEIOMYOMA, UNSPECIFIED LOCATION: Primary | ICD-10-CM

## 2018-07-05 DIAGNOSIS — N92.1 MENORRHAGIA WITH IRREGULAR CYCLE: ICD-10-CM

## 2018-07-05 NOTE — TELEPHONE ENCOUNTER
----- Message from Aaliyah Francois sent at 7/5/2018  9:54 AM CDT -----  Contact: CHAYO RENE [[id number]]            Name of Who is Calling: CHAYO RENE [[id number]]      What is the request in detail: pt state that  She is going to keep her surgery date July 30 @ 1:00,please advise       Can the clinic reply by [facility]: no      What [facility]: [phone]

## 2018-07-27 ENCOUNTER — ANESTHESIA EVENT (OUTPATIENT)
Dept: SURGERY | Facility: OTHER | Age: 43
End: 2018-07-27

## 2018-07-30 ENCOUNTER — TELEPHONE (OUTPATIENT)
Dept: OBSTETRICS AND GYNECOLOGY | Facility: CLINIC | Age: 43
End: 2018-07-30

## 2018-07-30 ENCOUNTER — ANESTHESIA (OUTPATIENT)
Dept: SURGERY | Facility: OTHER | Age: 43
End: 2018-07-30

## 2018-07-30 NOTE — TELEPHONE ENCOUNTER
Returned pt called at 11:00. This is a late entry. Pt stated that she had to reschedule her surgery because she stated that her surgery was not confirmed on Friday. Informed pt that I confirmed appointment when I spoke to her prior to surgery date. Pt stated that she had got a text message confirmation and thought it was changed. Pt stated that she would like to reschedule surgery. Informed pt that message will be forwarded to Dr. Zuleta. Pt verbalized understanding

## 2018-07-30 NOTE — TELEPHONE ENCOUNTER
----- Message from Aaliyah Francois sent at 7/30/2018 10:34 AM CDT -----  Contact: CHAYO RENE [[id number]]            Name of Who is Calling: CHAYO RENE [[id number]]      What is the request in detail: states nobody confirmed her surgery  time for today,please advise     Can the clinic reply by [facility]: no      What [facility][phone]

## 2018-08-16 ENCOUNTER — TELEPHONE (OUTPATIENT)
Dept: OBSTETRICS AND GYNECOLOGY | Facility: CLINIC | Age: 43
End: 2018-08-16

## 2018-08-16 NOTE — TELEPHONE ENCOUNTER
Called pt and asked her if she still wanted to have the surgery done. Pt stated she will call back after she check with her son schedule

## 2018-10-22 ENCOUNTER — OFFICE VISIT (OUTPATIENT)
Dept: OBSTETRICS AND GYNECOLOGY | Facility: CLINIC | Age: 43
End: 2018-10-22
Payer: MEDICAID

## 2018-10-22 VITALS
SYSTOLIC BLOOD PRESSURE: 118 MMHG | WEIGHT: 173.06 LBS | BODY MASS INDEX: 29.55 KG/M2 | DIASTOLIC BLOOD PRESSURE: 82 MMHG | HEIGHT: 64 IN

## 2018-10-22 DIAGNOSIS — D25.9 UTERINE LEIOMYOMA, UNSPECIFIED LOCATION: Primary | ICD-10-CM

## 2018-10-22 DIAGNOSIS — R10.2 FEMALE PELVIC PAIN: ICD-10-CM

## 2018-10-22 DIAGNOSIS — N92.1 MENORRHAGIA WITH IRREGULAR CYCLE: ICD-10-CM

## 2018-10-22 PROCEDURE — 99213 OFFICE O/P EST LOW 20 MIN: CPT | Mod: PBBFAC,PN | Performed by: OBSTETRICS & GYNECOLOGY

## 2018-10-22 PROCEDURE — 99213 OFFICE O/P EST LOW 20 MIN: CPT | Mod: S$PBB,,, | Performed by: OBSTETRICS & GYNECOLOGY

## 2018-10-22 PROCEDURE — 99999 PR PBB SHADOW E&M-EST. PATIENT-LVL III: CPT | Mod: PBBFAC,,, | Performed by: OBSTETRICS & GYNECOLOGY

## 2018-10-22 RX ORDER — SERTRALINE HYDROCHLORIDE 100 MG/1
TABLET, FILM COATED ORAL
COMMUNITY
Start: 2018-10-12 | End: 2019-04-03

## 2018-10-22 RX ORDER — ZOLPIDEM TARTRATE 10 MG/1
TABLET ORAL
Status: ON HOLD | COMMUNITY
Start: 2018-10-12 | End: 2018-11-29 | Stop reason: HOSPADM

## 2018-10-22 NOTE — PROGRESS NOTES
PT HERE WITH FIBROIDS. WAS SCHEDULED FOR SURGERY BUT HAD TECHNICAL DIFFICULTIES.    06/05/18  - Patient returns for pre-operative appointment  - She desires hysterectomy for large uterine fibroids with associated pain, bloating, and heavy/irregular bleeding   - Exam previously demonstrated a large abdominopelvic mass to the pt's umbilicus  - 5/2/2018: MRI demonstrates a 24.5-cm uterus with multiple fibroids, largest measuring 16-cm  - 4/24/2018: endometrial biopsy negative for hyperplasia or malignanc      05/02/20128 MRI  Uterus and ovaries: Markedly enlarged 24.5 x 10 x 16 cm with multiple fibroids.  The largest measures 16 x 9.6 x 6.7.  Fibroids distort the endometrium.  No obvious endometrial mass or fluid.  Neither ovary is identified.  Bladder: Unremarkable.  Soft tissues: Unremarkable.  Lymph nodes: No lymphadenopathy  Vasculature: Unremarkable.  Bones and joints: Unremarkable.  Bowel: Unremarkable.      Impression     1. Markedly enlarged myomatous uterus distorting the endometrial canal.  2. Ovaries are not identified.     ROS:  GENERAL: No fever, chills, fatigability or weight loss.  VULVAR: No pain, no lesions and no itching.  VAGINAL: No relaxation, no itching, no discharge, no abnormal bleeding and no lesions.  ABDOMEN: No abdominal pain. Denies nausea. Denies vomiting. No diarrhea. No constipation  BREAST: Denies pain. No lumps. No discharge.  URINARY: No incontinence, no nocturia, no frequency and no dysuria.  CARDIOVASCULAR: No chest pain. No shortness of breath. No leg cramps.  NEUROLOGICAL: No headaches. No vision changes.  The remainder of the review of systems was negative.    PE:  General Appearance: overweight And Well developed. Well nourished. In no acute distress.  Vulva: Lesions: No.  Urethral Meatus: Normal size. Normal location. No lesions. No prolapse.  Urethra: No masses. No tenderness. No prolapse. No scarring.  Bladder: No masses. No tenderness.  Vagina: Mucosa NI:yes discharge no,  atrophy no, cystocele no or rectocele no.  NARROW PUBIS  Cervix: Lesion: no  Stenotic: no Cervical motion tenderness: no  SMALL CVX.  Uterus: Uterus size: 24 weeks. Support good. Uterus size: Abnormal - IRREGULAR  Adnexa: Masses: No Tenderness: No CDS Nodularity: No  Abdomen: overweight NICHELLE. No tenderness.    PROCEDURES:    PLAN:     DIAGNOSIS:  1. Uterine leiomyoma, unspecified location    2. Menorrhagia with irregular cycle    3. Female pelvic pain        MEDICATIONS & ORDERS:     10 MIN D/W PT ON HYSTERECTOMY OPTIONS.    FOLLOW-UP: With DR MORENO FOR HYSTERECTOMY AT [facility]

## 2018-10-30 ENCOUNTER — OFFICE VISIT (OUTPATIENT)
Dept: OBSTETRICS AND GYNECOLOGY | Facility: CLINIC | Age: 43
End: 2018-10-30
Payer: MEDICAID

## 2018-10-30 VITALS
SYSTOLIC BLOOD PRESSURE: 124 MMHG | HEIGHT: 64 IN | DIASTOLIC BLOOD PRESSURE: 80 MMHG | WEIGHT: 174.63 LBS | BODY MASS INDEX: 29.81 KG/M2

## 2018-10-30 DIAGNOSIS — L68.0 HIRSUTISM: Primary | ICD-10-CM

## 2018-10-30 DIAGNOSIS — D21.9 LEIOMYOMA: ICD-10-CM

## 2018-10-30 PROCEDURE — 99213 OFFICE O/P EST LOW 20 MIN: CPT | Mod: PBBFAC,PN | Performed by: OBSTETRICS & GYNECOLOGY

## 2018-10-30 PROCEDURE — 99999 PR PBB SHADOW E&M-EST. PATIENT-LVL III: CPT | Mod: PBBFAC,,, | Performed by: OBSTETRICS & GYNECOLOGY

## 2018-10-30 PROCEDURE — 99214 OFFICE O/P EST MOD 30 MIN: CPT | Mod: S$PBB,,, | Performed by: OBSTETRICS & GYNECOLOGY

## 2018-10-30 NOTE — PROGRESS NOTES
History & Physical  Gynecology      SUBJECTIVE:     Chief Complaint: Fibroids and Consult       History of Present Illness:  Ms. Soni is a 43 yr old female who presents for counseling re hysterectomy for AUB-L. She reports irregular, heavy cycles with abdominal pain and pressure. An embx was done which was negative. TSH normal. MRI shows enlarged uterus with fibroids. She desires definitive surgical management. Her surgical history is significant for  section x 2. She does smoke and desires to cut back. She is otherwise in good health. She also reports new onset hirsutism.     MRI       Uterus and ovaries: Markedly enlarged 24.5 x 10 x 16 cm with multiple fibroids.  The largest measures 16 x 9.6 x 6.7.  Fibroids distort the endometrium.  No obvious endometrial mass or fluid.  Neither ovary is identified.  Bladder: Unremarkable.  Soft tissues: Unremarkable.  Lymph nodes: No lymphadenopathy  Vasculature: Unremarkable.  Bones and joints: Unremarkable.  Bowel: Unremarkable.       Impression       1. Markedly enlarged myomatous uterus distorting the endometrial canal.  2. Ovaries are not identified.       Endometrial Biopsy:  FINAL PATHOLOGIC DIAGNOSIS  Endometrial biopsy:  Proliferative pattern endometrium.  Negative for hyperplasia and malignancy.      Review of patient's allergies indicates:  No Known Allergies    Past Medical History:   Diagnosis Date    Anxiety     Depression     Fibroids     Heart murmur     Hemolytic anemia 2018    Hypertension     Smoker      Past Surgical History:   Procedure Laterality Date     SECTION  ,      OB History      Para Term  AB Living    2 2 2     2    SAB TAB Ectopic Multiple Live Births            2        Family History   Problem Relation Age of Onset    Diabetes Mother     Hyperlipidemia Maternal Grandmother     Diabetes Maternal Grandmother     Breast cancer Neg Hx     Colon cancer Neg Hx     Ovarian cancer  Neg Hx      Social History     Tobacco Use    Smoking status: Current Every Day Smoker     Packs/day: 0.50     Years: 15.00     Pack years: 7.50     Types: Cigarettes    Smokeless tobacco: Never Used   Substance Use Topics    Alcohol use: Yes     Comment: Occassions only     Drug use: No       Current Outpatient Medications   Medication Sig    amLODIPine (NORVASC) 10 MG tablet Take 1 tablet (10 mg total) by mouth once daily.    lisinopril 10 MG tablet Take 1 tablet (10 mg total) by mouth once daily.    sertraline (ZOLOFT) 100 MG tablet     traZODone (DESYREL) 50 MG tablet Take 1 tablet (50 mg total) by mouth nightly.    zolpidem (AMBIEN) 10 mg Tab      No current facility-administered medications for this visit.          Review of Systems:  Review of Systems   Constitutional: Negative for activity change, fatigue, fever and unexpected weight change.   Respiratory: Negative for cough and shortness of breath.    Cardiovascular: Negative for chest pain and palpitations.   Gastrointestinal: Negative for abdominal pain, constipation, diarrhea and nausea.   Endocrine: Negative for hot flashes.   Genitourinary: Positive for menorrhagia, menstrual problem and pelvic pain. Negative for dyspareunia, dysuria and vaginal discharge.   Musculoskeletal: Negative for back pain.   Skin:  Positive for hair changes.   Neurological: Negative for headaches.   Psychiatric/Behavioral: The patient is not nervous/anxious.    Breast: Negative for nipple discharge       OBJECTIVE:     Physical Exam:  Physical Exam   Constitutional: She is oriented to person, place, and time. She appears well-developed and well-nourished.   HENT:   Head: Normocephalic and atraumatic.   Neck: Normal range of motion. Neck supple.   Cardiovascular: Normal rate, regular rhythm, normal heart sounds and intact distal pulses.   Pulmonary/Chest: Effort normal and breath sounds normal.   Abdominal: Soft. Bowel sounds are normal. There is no tenderness. There  is no guarding.   Genitourinary:   Genitourinary Comments: Patient currently declines internal exam. Will plan for exam at preop appointment   Neurological: She is alert and oriented to person, place, and time.   Skin: Skin is warm and dry.   Psychiatric: She has a normal mood and affect.   Vitals reviewed.        ASSESSMENT:       ICD-10-CM ICD-9-CM    1. Hirsutism L68.0 704.1 Testosterone, free      DHEA-sulfate   2. Leiomyoma D21.9 215.9           Plan:      Discussed treatment of fibroids including expectant, medical and surgical management. Patient desires definitive surgical management.   Discussed risks, benefits and alternatives  Discussed approaches to hysterectomy and given size of uterus recommend total abdominal hysterectomy via midline vertical incision  Discussed ovarian removal vs preservation. Patient opts for preservation.  Will discuss dates for surgery with family and will call clinic to schedule. Will need preop appointment one week prior to surgery   Surgery at this time tentatively planned for 11/ 26/2018.   Advised strict smoking cessation prior to surgery. Pt voiced understanding.  Will order hirsutism labs this visit as well. CBC and type and screen to be done at preop appointment.  RTC prior to surgery for preop     Counseling time: 15 minutes    Eliseo Huber

## 2018-10-31 ENCOUNTER — TELEPHONE (OUTPATIENT)
Dept: OBSTETRICS AND GYNECOLOGY | Facility: CLINIC | Age: 43
End: 2018-10-31

## 2018-10-31 DIAGNOSIS — D21.9 LEIOMYOMA: Primary | ICD-10-CM

## 2018-10-31 RX ORDER — SODIUM CHLORIDE, SODIUM LACTATE, POTASSIUM CHLORIDE, CALCIUM CHLORIDE 600; 310; 30; 20 MG/100ML; MG/100ML; MG/100ML; MG/100ML
INJECTION, SOLUTION INTRAVENOUS CONTINUOUS
Status: CANCELLED | OUTPATIENT
Start: 2018-10-31

## 2018-10-31 NOTE — TELEPHONE ENCOUNTER
----- Message from Jaylin Ramon sent at 10/31/2018 10:03 AM CDT -----  Contact: jimmie  Name of Who is Calling: jimmie      What is the request in detail: Patient is requesting a call back concerning her surgery date      Can the clinic reply by [facility]: no      What Number to Call Back [facility]: [phone]

## 2018-10-31 NOTE — TELEPHONE ENCOUNTER
Spoke with pt. Pt states she would like to schedule surgery for 11/26/18. Advised pt we would go ahead and get surgery scheduled for her. Pt verbalized understanding.

## 2018-11-20 ENCOUNTER — OFFICE VISIT (OUTPATIENT)
Dept: OBSTETRICS AND GYNECOLOGY | Facility: CLINIC | Age: 43
End: 2018-11-20
Payer: MEDICAID

## 2018-11-20 VITALS
BODY MASS INDEX: 29.5 KG/M2 | HEIGHT: 64 IN | DIASTOLIC BLOOD PRESSURE: 88 MMHG | SYSTOLIC BLOOD PRESSURE: 138 MMHG | WEIGHT: 172.81 LBS

## 2018-11-20 DIAGNOSIS — Z01.818 PREOP EXAMINATION: Primary | ICD-10-CM

## 2018-11-20 LAB
CANDIDA RRNA VAG QL PROBE: NEGATIVE
G VAGINALIS RRNA GENITAL QL PROBE: POSITIVE
T VAGINALIS RRNA GENITAL QL PROBE: NEGATIVE

## 2018-11-20 PROCEDURE — 99499 UNLISTED E&M SERVICE: CPT | Mod: S$PBB,,, | Performed by: OBSTETRICS & GYNECOLOGY

## 2018-11-20 PROCEDURE — 87660 TRICHOMONAS VAGIN DIR PROBE: CPT

## 2018-11-20 PROCEDURE — 99213 OFFICE O/P EST LOW 20 MIN: CPT | Mod: PBBFAC,PN | Performed by: OBSTETRICS & GYNECOLOGY

## 2018-11-20 PROCEDURE — 99999 PR PBB SHADOW E&M-EST. PATIENT-LVL III: CPT | Mod: PBBFAC,,, | Performed by: OBSTETRICS & GYNECOLOGY

## 2018-11-20 RX ORDER — LAMOTRIGINE 25 MG/1
TABLET ORAL
COMMUNITY
Start: 2018-10-31 | End: 2020-01-08

## 2018-11-20 RX ORDER — HYDROXYZINE PAMOATE 50 MG/1
CAPSULE ORAL
COMMUNITY
Start: 2018-10-31 | End: 2019-04-03

## 2018-11-20 NOTE — PROGRESS NOTES
History & Physical  Gynecology      SUBJECTIVE:     Chief Complaint: consents       History of Present Illness:  Ms. Soni is a 43 yr old female who presents for preop examination for scheduled ENA/BS 2/ AUB-L. She reports irregular, heavy cycles with abdominal pain and pressure. An embx was done which was negative. TSH normal. MRI shows enlarged uterus with fibroids. She desires definitive surgical management. Her surgical history is significant for  section x 2. She does smoke and desires to cut back. She is otherwise in good health. She also reports new onset hirsutism.     MRI       Uterus and ovaries: Markedly enlarged 24.5 x 10 x 16 cm with multiple fibroids.  The largest measures 16 x 9.6 x 6.7.  Fibroids distort the endometrium.  No obvious endometrial mass or fluid.  Neither ovary is identified.  Bladder: Unremarkable.  Soft tissues: Unremarkable.  Lymph nodes: No lymphadenopathy  Vasculature: Unremarkable.  Bones and joints: Unremarkable.  Bowel: Unremarkable.       Impression       1. Markedly enlarged myomatous uterus distorting the endometrial canal.  2. Ovaries are not identified.       Endometrial Biopsy:  FINAL PATHOLOGIC DIAGNOSIS  Endometrial biopsy:  Proliferative pattern endometrium.  Negative for hyperplasia and malignancy.      Review of patient's allergies indicates:  No Known Allergies    Past Medical History:   Diagnosis Date    Anxiety     Depression     Fibroids     Heart murmur     Hemolytic anemia 2018    Hypertension     Smoker      Past Surgical History:   Procedure Laterality Date     SECTION  ,      OB History      Para Term  AB Living    2 2 2     2    SAB TAB Ectopic Multiple Live Births            2        Family History   Problem Relation Age of Onset    Diabetes Mother     Hyperlipidemia Maternal Grandmother     Diabetes Maternal Grandmother     Breast cancer Neg Hx     Colon cancer Neg Hx     Ovarian cancer  Neg Hx      Social History     Tobacco Use    Smoking status: Current Every Day Smoker     Packs/day: 0.50     Years: 15.00     Pack years: 7.50     Types: Cigarettes    Smokeless tobacco: Never Used   Substance Use Topics    Alcohol use: Yes     Comment: Occassions only     Drug use: No       Current Outpatient Medications   Medication Sig    amLODIPine (NORVASC) 10 MG tablet Take 1 tablet (10 mg total) by mouth once daily.    hydrOXYzine pamoate (VISTARIL) 50 MG Cap     lamoTRIgine (LAMICTAL) 25 MG tablet     lisinopril 10 MG tablet Take 1 tablet (10 mg total) by mouth once daily.    sertraline (ZOLOFT) 100 MG tablet     traZODone (DESYREL) 50 MG tablet Take 1 tablet (50 mg total) by mouth nightly.    zolpidem (AMBIEN) 10 mg Tab      No current facility-administered medications for this visit.          Review of Systems:  Review of Systems   Constitutional: Negative for activity change, fatigue, fever and unexpected weight change.   Respiratory: Negative for cough and shortness of breath.    Cardiovascular: Negative for chest pain and palpitations.   Gastrointestinal: Negative for abdominal pain, constipation, diarrhea and nausea.   Endocrine: Negative for hot flashes.   Genitourinary: Positive for menorrhagia, menstrual problem and pelvic pain. Negative for dyspareunia, dysuria and vaginal discharge.   Musculoskeletal: Negative for back pain.   Neurological: Negative for headaches.   Psychiatric/Behavioral: The patient is not nervous/anxious.    Breast: Negative for nipple discharge       OBJECTIVE:     Physical Exam:  Physical Exam   Constitutional: She is oriented to person, place, and time. She appears well-developed and well-nourished.   HENT:   Head: Normocephalic and atraumatic.   Neck: Normal range of motion. Neck supple.   Cardiovascular: Normal rate, regular rhythm, normal heart sounds and intact distal pulses.   Pulmonary/Chest: Effort normal and breath sounds normal.   Abdominal: Soft. Bowel  sounds are normal. There is no tenderness. There is no guarding.   Genitourinary: There is no rash, tenderness, lesion or injury on the right labia. There is no rash, tenderness, lesion or injury on the left labia.   Genitourinary Comments: Large 22 week size uterus with fibroid extending off left fundus. Mobile.    Neurological: She is alert and oriented to person, place, and time.   Skin: Skin is warm and dry.   Psychiatric: She has a normal mood and affect.   Vitals reviewed.        ASSESSMENT:       ICD-10-CM ICD-9-CM    1. Preop examination Z01.818 V72.84 CBC auto differential      TYPE AND SCREEN PREOP          Plan:      Discussed treatment of fibroids including expectant, medical and surgical management. Patient desires definitive surgical management.   Discussed risks, benefits and alternatives.   Discussed approaches to hysterectomy and given size of uterus recommend total abdominal hysterectomy via midline vertical incision  Discussed ovarian removal vs preservation. Patient opts for preservation unless grossly abnormal.  Advised strict smoking cessation prior to surgery. Pt voiced understanding  CBC and type and screen, bmp today. Will meet with anesthesia today.  To OR 11/26/2018  RTC in 5 weeks for postop appointment    Counseling time: 15 minutes    Eliseo Huber

## 2018-11-21 ENCOUNTER — TELEPHONE (OUTPATIENT)
Dept: OBSTETRICS AND GYNECOLOGY | Facility: CLINIC | Age: 43
End: 2018-11-21

## 2018-11-21 DIAGNOSIS — Z01.818 PREOP EXAMINATION: Primary | ICD-10-CM

## 2018-11-21 RX ORDER — METRONIDAZOLE 500 MG/1
500 TABLET ORAL EVERY 12 HOURS
Qty: 14 TABLET | Refills: 0 | Status: ON HOLD | OUTPATIENT
Start: 2018-11-21 | End: 2018-11-29 | Stop reason: HOSPADM

## 2018-11-21 NOTE — TELEPHONE ENCOUNTER
----- Message from Irina Farooq sent at 11/21/2018 11:18 AM CST -----  Contact: pt            Name of Who is Calling: pt      What is the request in detail: is returning a call      Can the clinic reply by [facility]: no      What Number to Call Back if not in [facility]: [phone]

## 2018-11-21 NOTE — TELEPHONE ENCOUNTER
----- Message from Jaylin Ramon sent at 11/21/2018 12:40 PM CST -----  Contact: Chayo  Name of Who is Calling: CHAYO RENE [[id number]]      What is the request in detail: Patient return a call from staff    Can the clinic reply by [facility]: No      What [facility]: [phone] ADM

## 2018-11-21 NOTE — TELEPHONE ENCOUNTER
----- Message from Eliseo Huber MD sent at 11/21/2018  8:01 AM CST -----  Results reviewed. + BV. Rx called to pharmacy. Please notify patient

## 2018-11-26 PROBLEM — F41.9 ANXIETY AND DEPRESSION: Status: ACTIVE | Noted: 2018-11-26

## 2018-11-26 PROBLEM — D21.9 LEIOMYOMA: Status: ACTIVE | Noted: 2018-11-26

## 2018-11-26 PROBLEM — Z90.710 S/P ABDOMINAL HYSTERECTOMY: Status: ACTIVE | Noted: 2018-11-26

## 2018-11-26 PROBLEM — I10 ESSENTIAL HYPERTENSION: Status: ACTIVE | Noted: 2018-11-26

## 2018-11-26 PROBLEM — D21.9 LEIOMYOMA: Status: RESOLVED | Noted: 2018-11-26 | Resolved: 2018-11-26

## 2018-11-26 PROBLEM — F32.A ANXIETY AND DEPRESSION: Status: ACTIVE | Noted: 2018-11-26

## 2018-11-29 PROBLEM — D21.9 LEIOMYOMA: Status: RESOLVED | Noted: 2018-11-26 | Resolved: 2018-11-29

## 2018-11-29 PROBLEM — D64.9 ANEMIA: Chronic | Status: RESOLVED | Noted: 2018-05-04 | Resolved: 2018-11-29

## 2018-11-30 ENCOUNTER — TELEPHONE (OUTPATIENT)
Dept: OBSTETRICS AND GYNECOLOGY | Facility: CLINIC | Age: 43
End: 2018-11-30

## 2018-11-30 NOTE — TELEPHONE ENCOUNTER
----- Message from Toñito Rm sent at 11/30/2018 11:10 AM CST -----  Contact: CHAYO RENE [[id number]]  Name of Who is Calling: CHAYO RENE [[id number]]       What is the request in detail: Patient called and requested to speak with nurse. Patient requested a call back at earliest convenience.      Can the clinic reply by [facility]: No       What Number to Call Back if not in [facility]: [phone]

## 2018-11-30 NOTE — TELEPHONE ENCOUNTER
Spoke with pt. Pt states she is not able to get her Percocet's filled. Pt states the pharmacy told her it is written for too many a day and that she would need a PA. Pt is requesting medication for pain. Please advise.

## 2018-12-03 ENCOUNTER — TELEPHONE (OUTPATIENT)
Dept: OBSTETRICS AND GYNECOLOGY | Facility: CLINIC | Age: 43
End: 2018-12-03

## 2018-12-03 NOTE — TELEPHONE ENCOUNTER
----- Message from Irina Farooq sent at 12/3/2018 10:23 AM CST -----  Contact: pt            Name of Who is Calling: pt      What is the request in detail: is returning a call      Can the clinic reply by [facility]: no      What Number to Call Back if not in [facility]: [phone]

## 2018-12-05 ENCOUNTER — TELEPHONE (OUTPATIENT)
Dept: OBSTETRICS AND GYNECOLOGY | Facility: CLINIC | Age: 43
End: 2018-12-05

## 2018-12-05 NOTE — TELEPHONE ENCOUNTER
"----- Message from Ni Gonzalez sent at 12/5/2018  8:26 AM CST -----  Contact: CHAYO RENE [[id number]]                                                MEDICATION  REFILL REQUEST      Please refill the medication(s) listed below. Please call the patient when the prescription(s) is ready for  at this phone number   CHAYO RENE N / # [phone]       Medication #1 oxyCODONE-acetaminophen (PERCOCET) 5-325 mg per tablet      Preferred Pharmacy: [address]), [place] [address] [address]     [phone] (Phone)  [phone] (Fax)          Name of Who is Calling: CHAYO RENE [[id number]]      What is the request in detail:  Patient called to make this office aware, "since her surgery her left foot has been burning."   Please give a call back at your earliest convenience.     THANKS!      Can the clinic reply by [facility]:  No      What Number to Call Back: CHAYO RENE / #  [phone]                                           "

## 2018-12-05 NOTE — TELEPHONE ENCOUNTER
Spoke with pt. Advised pt Dr Huber would print another prescription for her and it would be ready to  this afternoon. Also, advised pt that Dr Huber is not sure what is going on with her foot, but she will look at it at her visit next week. Pt verbalized understanding.

## 2018-12-10 ENCOUNTER — TELEPHONE (OUTPATIENT)
Dept: OBSTETRICS AND GYNECOLOGY | Facility: CLINIC | Age: 43
End: 2018-12-10

## 2018-12-10 NOTE — TELEPHONE ENCOUNTER
----- Message from Ximena Ansari sent at 12/10/2018 12:44 PM CST -----  Contact: pt   Can the clinic reply in [facility]: no       Please refill the medication(s) listed below. Please call the patient when the prescription(s) is ready for  at the phone number  [phone]    Medication #1: oxyCODONE-acetaminophen (PERCOCET) 5-325 mg per tablet    Medication #2:       Preferred Pharmacy:  [facility]  [address] [place], [place] [address]

## 2018-12-11 ENCOUNTER — OFFICE VISIT (OUTPATIENT)
Dept: OBSTETRICS AND GYNECOLOGY | Facility: CLINIC | Age: 43
End: 2018-12-11
Payer: MEDICAID

## 2018-12-11 VITALS
DIASTOLIC BLOOD PRESSURE: 98 MMHG | BODY MASS INDEX: 29.06 KG/M2 | SYSTOLIC BLOOD PRESSURE: 150 MMHG | HEIGHT: 64 IN | WEIGHT: 170.19 LBS

## 2018-12-11 DIAGNOSIS — Z09 POSTOP CHECK: Primary | ICD-10-CM

## 2018-12-11 PROCEDURE — 99024 POSTOP FOLLOW-UP VISIT: CPT | Mod: ,,, | Performed by: OBSTETRICS & GYNECOLOGY

## 2018-12-11 PROCEDURE — 99999 PR PBB SHADOW E&M-EST. PATIENT-LVL III: CPT | Mod: PBBFAC,,, | Performed by: OBSTETRICS & GYNECOLOGY

## 2018-12-11 PROCEDURE — 99213 OFFICE O/P EST LOW 20 MIN: CPT | Mod: PBBFAC,PN | Performed by: OBSTETRICS & GYNECOLOGY

## 2018-12-11 RX ORDER — OXYCODONE AND ACETAMINOPHEN 5; 325 MG/1; MG/1
1 TABLET ORAL EVERY 4 HOURS PRN
Qty: 7 TABLET | Refills: 0 | Status: SHIPPED | OUTPATIENT
Start: 2018-12-11 | End: 2019-03-12

## 2018-12-11 NOTE — PROGRESS NOTES
History & Physical  Gynecology      SUBJECTIVE:     Chief Complaint: Suture / Staple Removal       History of Present Illness:  Ms. Soni is a 43 yr old female who is 2 weeks s/p ENA/BS 2/2 AUB-L. No complaints. Benign postop course. Tolerating regular diet. Normal bowel and bladder function. Pain well controlled. Denies vaginal bleeding. Pathology reviewed.      Review of patient's allergies indicates:  No Known Allergies    Past Medical History:   Diagnosis Date    Anxiety     Depression     Fibroids     Heart murmur     Hemolytic anemia 2018    Hypertension     Smoker      Past Surgical History:   Procedure Laterality Date     SECTION  ,     CYSTOSCOPY N/A 2018    Procedure: CYSTOSCOPY;  Surgeon: Eliseo Huber MD;  Location: [facility];  Service: OB/GYN;  Laterality: N/A;    CYSTOSCOPY N/A 2018    Performed by Eliseo Huber MD at [facility] OR    HYSTERECTOMY, TOTAL, ABDOMINAL N/A 2018    Performed by Eliseo Huber MD at [facility]    TOTAL ABDOMINAL HYSTERECTOMY  2018    TOTAL ABDOMINAL HYSTERECTOMY N/A 2018    Procedure: HYSTERECTOMY, TOTAL, ABDOMINAL;  Surgeon: Eliseo Huber MD;  Location: [facility] OR;  Service: OB/GYN;  Laterality: N/A;  Need Bookwalter retractor 2 UNITS OF RBBC IN LAB     OB History      Para Term  AB Living    2 2 2     2    SAB TAB Ectopic Multiple Live Births            2        Family History   Problem Relation Age of Onset    Diabetes Mother     Hyperlipidemia Maternal Grandmother     Diabetes Maternal Grandmother     Breast cancer Neg Hx     Colon cancer Neg Hx     Ovarian cancer Neg Hx      Social History     Tobacco Use    Smoking status: Current Every Day Smoker     Packs/day: 0.50     Years: 15.00     Pack years: 7.50     Types: Cigarettes    Smokeless tobacco: Never Used   Substance Use Topics    Alcohol use: Yes     Comment: Occassions only     Drug use: No       Current Outpatient Medications    Medication Sig    ibuprofen (ADVIL,MOTRIN) 600 MG tablet Take 1 tablet (600 mg total) by mouth every 6 (six) hours.    oxyCODONE-acetaminophen (PERCOCET) 5-325 mg per tablet Take 1 tablet by mouth every 4 (four) hours as needed for Pain.    sertraline (ZOLOFT) 100 MG tablet     traZODone (DESYREL) 50 MG tablet Take 1 tablet (50 mg total) by mouth nightly.    amLODIPine (NORVASC) 10 MG tablet Take 1 tablet (10 mg total) by mouth once daily.    hydrOXYzine pamoate (VISTARIL) 50 MG Cap     lamoTRIgine (LAMICTAL) 25 MG tablet     lisinopril 10 MG tablet Take 1 tablet (10 mg total) by mouth once daily.     No current facility-administered medications for this visit.          Review of Systems:  Review of Systems   Constitutional: Negative for activity change, fatigue, fever and unexpected weight change.   Respiratory: Negative for cough and shortness of breath.    Cardiovascular: Negative for chest pain and palpitations.   Gastrointestinal: Negative for abdominal pain, constipation, diarrhea and nausea.   Endocrine: Negative for hot flashes.   Genitourinary: Negative for dyspareunia, dysuria, menorrhagia, menstrual problem, pelvic pain and vaginal discharge.   Musculoskeletal: Negative for back pain.   Neurological: Negative for headaches.   Psychiatric/Behavioral: The patient is not nervous/anxious.    Breast: Negative for nipple discharge       OBJECTIVE:     Physical Exam:  Physical Exam   Constitutional: She is oriented to person, place, and time. She appears well-developed and well-nourished.   HENT:   Head: Normocephalic and atraumatic.   Neck: Normal range of motion. Neck supple.   Cardiovascular: Normal rate, regular rhythm, normal heart sounds and intact distal pulses.   Pulmonary/Chest: Effort normal and breath sounds normal.   Abdominal: Soft. Bowel sounds are normal. There is no tenderness. There is no guarding.       Genitourinary: There is no rash, tenderness, lesion or injury on the right labia.  There is no rash, tenderness, lesion or injury on the left labia.   Neurological: She is alert and oriented to person, place, and time.   Skin: Skin is warm and dry.   Psychiatric: She has a normal mood and affect.   Vitals reviewed.        ASSESSMENT:       ICD-10-CM ICD-9-CM    1. Postop check Z09 V67.00           Plan:      Staples removed without difficulty  Benign postop course  RTC in 2 weeks for pelvic exam    Counseling time: 15 minutes    Eliseo Huber

## 2018-12-17 PROBLEM — F51.02 ADJUSTMENT INSOMNIA: Chronic | Status: ACTIVE | Noted: 2018-12-17

## 2019-01-02 ENCOUNTER — OFFICE VISIT (OUTPATIENT)
Dept: OBSTETRICS AND GYNECOLOGY | Facility: CLINIC | Age: 44
End: 2019-01-02
Payer: MEDICAID

## 2019-01-02 VITALS
HEIGHT: 64 IN | DIASTOLIC BLOOD PRESSURE: 94 MMHG | WEIGHT: 172.81 LBS | BODY MASS INDEX: 29.5 KG/M2 | SYSTOLIC BLOOD PRESSURE: 140 MMHG

## 2019-01-02 DIAGNOSIS — Z09 POSTOP CHECK: Primary | ICD-10-CM

## 2019-01-02 PROCEDURE — 99999 PR PBB SHADOW E&M-EST. PATIENT-LVL III: CPT | Mod: PBBFAC,,, | Performed by: OBSTETRICS & GYNECOLOGY

## 2019-01-02 PROCEDURE — 99024 PR POST-OP FOLLOW-UP VISIT: ICD-10-PCS | Mod: ,,, | Performed by: OBSTETRICS & GYNECOLOGY

## 2019-01-02 PROCEDURE — 99213 OFFICE O/P EST LOW 20 MIN: CPT | Mod: PBBFAC,PN | Performed by: OBSTETRICS & GYNECOLOGY

## 2019-01-02 PROCEDURE — 99024 POSTOP FOLLOW-UP VISIT: CPT | Mod: ,,, | Performed by: OBSTETRICS & GYNECOLOGY

## 2019-01-02 PROCEDURE — 99999 PR PBB SHADOW E&M-EST. PATIENT-LVL III: ICD-10-PCS | Mod: PBBFAC,,, | Performed by: OBSTETRICS & GYNECOLOGY

## 2019-01-02 NOTE — PROGRESS NOTES
History & Physical  Gynecology      SUBJECTIVE:     Chief Complaint: Post-op Evaluation       History of Present Illness:  Ms. Soni is a 43 yr old female who is approximately 6 weeks postop from ENA/BS / AUB-L. Has no complaints. Benign postop course. Denies pain. Denies vaginal bleeding. Tolerating PO. Normal bowel and bladder function.       Review of patient's allergies indicates:  No Known Allergies    Past Medical History:   Diagnosis Date    Anxiety     Depression     Fibroids     Heart murmur     Hemolytic anemia 2018    Hypertension     Smoker      Past Surgical History:   Procedure Laterality Date     SECTION  ,     CYSTOSCOPY N/A 2018    Performed by [facility] at [facility]    HYSTERECTOMY, TOTAL, ABDOMINAL N/A 2018    Performed by [facility]o Huber MD at [facility]    TOTAL ABDOMINAL HYSTERECTOMY  2018     OB History      Para Term  AB Living    2 2 2     2    SAB TAB Ectopic Multiple Live Births            2        Family History   Problem Relation Age of Onset    Diabetes Mother     Hyperlipidemia Maternal Grandmother     Diabetes Maternal Grandmother     Breast cancer Neg Hx     Colon cancer Neg Hx     Ovarian cancer Neg Hx      Social History     Tobacco Use    Smoking status: Current Every Day Smoker     Packs/day: 0.50     Years: 15.00     Pack years: 7.50     Types: Cigarettes    Smokeless tobacco: Never Used   Substance Use Topics    Alcohol use: Yes     Comment: Occassions only     Drug use: No       Current Outpatient Medications   Medication Sig    amLODIPine (NORVASC) 10 MG tablet Take 1 tablet (10 mg total) by mouth once daily.    hydrOXYzine pamoate (VISTARIL) 50 MG Cap     ibuprofen (ADVIL,MOTRIN) 600 MG tablet Take 1 tablet (600 mg total) by mouth every 6 (six) hours.    lamoTRIgine (LAMICTAL) 25 MG tablet     lisinopril 10 MG tablet Take 1 tablet (10 mg total) by mouth once daily.     oxyCODONE-acetaminophen (PERCOCET) 5-325 mg per tablet Take 1 tablet by mouth every 4 (four) hours as needed for Pain.    sertraline (ZOLOFT) 100 MG tablet     traMADol (ULTRAM) 50 mg tablet Take 1 tablet (50 mg total) by mouth every 12 (twelve) hours as needed for Pain.    traZODone (DESYREL) 50 MG tablet Take 1 tablet (50 mg total) by mouth nightly.     No current facility-administered medications for this visit.          Review of Systems:  Review of Systems   Constitutional: Negative for activity change, fatigue, fever and unexpected weight change.   Respiratory: Negative for cough and shortness of breath.    Cardiovascular: Negative for chest pain and palpitations.   Gastrointestinal: Negative for abdominal pain, constipation, diarrhea and nausea.   Endocrine: Negative for hot flashes.   Genitourinary: Negative for dyspareunia, dysuria, menorrhagia, menstrual problem, pelvic pain, vaginal bleeding and vaginal discharge.   Musculoskeletal: Negative for back pain.   Neurological: Negative for headaches.   Psychiatric/Behavioral: The patient is not nervous/anxious.    Breast: Negative for nipple discharge       OBJECTIVE:     Physical Exam:  Physical Exam   Constitutional: She is oriented to person, place, and time. She appears well-developed and well-nourished.   HENT:   Head: Normocephalic and atraumatic.   Neck: Normal range of motion. Neck supple.   Cardiovascular: Normal rate, regular rhythm, normal heart sounds and intact distal pulses.   Pulmonary/Chest: Effort normal and breath sounds normal.   Abdominal: Soft. Bowel sounds are normal. There is no tenderness. There is no guarding.   Genitourinary: There is no rash, tenderness, lesion or injury on the right labia. There is no rash, tenderness, lesion or injury on the left labia. Right adnexum displays no mass, no tenderness and no fullness. Left adnexum displays no mass, no tenderness and no fullness. No erythema, tenderness or bleeding in the vagina. No  foreign body in the vagina. No signs of injury around the vagina. No vaginal discharge found.   Genitourinary Comments: Uterus and cervix surgically absent. Cuff well healed. Intact.   Neurological: She is alert and oriented to person, place, and time.   Skin: Skin is warm and dry.   Psychiatric: She has a normal mood and affect.   Vitals reviewed.        ASSESSMENT:       ICD-10-CM ICD-9-CM    1. Postop check Z09 V67.00           Plan:      Vaginal cuff well healed. Intact  May discontinue pelvic rest  Benign postop course. No complaints today  RTC in 1 yr for annual exam or PRN    Counseling time: 15 minutes    Eliseo Huber

## 2020-04-08 PROBLEM — R46.89 NON-COMPLIANT BEHAVIOR: Status: ACTIVE | Noted: 2020-04-08

## 2020-07-08 PROBLEM — F43.21 GRIEVING: Status: ACTIVE | Noted: 2020-07-08

## 2020-07-21 ENCOUNTER — OFFICE VISIT (OUTPATIENT)
Dept: PSYCHIATRY | Facility: CLINIC | Age: 45
End: 2020-07-21
Payer: MEDICAID

## 2020-07-21 DIAGNOSIS — F43.0 ACUTE STRESS DISORDER: ICD-10-CM

## 2020-07-21 DIAGNOSIS — F41.1 ANXIETY, GENERALIZED: ICD-10-CM

## 2020-07-21 DIAGNOSIS — F43.21 GRIEVING: Primary | ICD-10-CM

## 2020-07-21 PROCEDURE — 99212 OFFICE O/P EST SF 10 MIN: CPT | Mod: PBBFAC

## 2020-07-21 PROCEDURE — 90791 PR PSYCHIATRIC DIAGNOSTIC EVALUATION: ICD-10-PCS | Mod: 95,AJ,HB,

## 2020-07-21 PROCEDURE — 90791 PSYCH DIAGNOSTIC EVALUATION: CPT | Mod: 95,AJ,HB,

## 2020-07-21 PROCEDURE — 99999 PR PBB SHADOW E&M-EST. PATIENT-LVL II: ICD-10-PCS | Mod: PBBFAC,,,

## 2020-07-21 PROCEDURE — 99999 PR PBB SHADOW E&M-EST. PATIENT-LVL II: CPT | Mod: PBBFAC,,,

## 2020-07-21 NOTE — PATIENT INSTRUCTIONS
Grief Reaction  Grief is the feeling that we all have when we lose someone or something that has been important in our life. Grief is an unavoidable and normal reaction to this loss, and can last from months to years. The amount of time depends on different factors. These include how close the person was to you, and how much support you have through the grief process. Symptoms can be both physical and emotional.  Physical reactions:  · Loss of appetite or overeating  · Changes in weight  · Trouble getting to sleep or staying asleep  · Hair loss  · Upset stomach, indigestion, heart burn, abdominal pain, cramping, diarrhea  · Sense of trouble breathing  · Trembling, shakiness  Emotional reactions:  · Sadness  · Anxiety  · Feeling depressed or helpless  · Difficulty concentrating  · Detachment or withdrawal from those around you  · Loss of interest in your normal life and work  Home care  · Allow yourself to feel the pain of your loss. For some, this can be a key part of healing grief. Talk about your pain with others who understand. Share good memories that involve the person, pet, or possession  you lost.  · Take time for yourself. Make it a point to do things that you enjoy (gardening, walking in nature, going to a movie, etc.).  · Take care of your physical body. Eat a balanced diet (low in saturated fat and high in fruits and vegetables) and establish an exercise plan at least 3 times a week for 30 minutes. Even mild-moderate exercise (like brisk walking) can make you feel better. Get plenty of sleep.  · Avoid the use of alcohol and drugs to cover your emotional pain. This only slows down the emotional healing process.  · Do not isolate yourself from others. Have daily contact with family or friends. Talk about your loss to those closest to you.  · For additional support, meet with your //rabbi, a counselor or therapist, or your own doctor.  · Consider joining a grief support group. Ask your doctor  or our staff for information on how to find one in your area.  · If you have been prescribed a medicine to help with your symptoms, take it only as directed. Do not use it with alcohol.  Follow-up care  Follow up with your healthcare provider, or as advised.  Call 911  Call 911 if any of these occur:  · Trouble breathing  · Very confused  · Very drowsy or trouble awakening  · Fainting or loss of consciousness  · Rapid heart rate  · Seizure  · New chest pain that becomes more severe, lasts longer, or spreads into your shoulder, arm, neck, jaw or back  When to seek medical advice  Call your healthcare provider right away if any of these occur:  · Worsening symptoms  · Unable to eat or sleep for three days in a row  · Feeling extreme depression, fear, anxiety, or anger toward yourself or others  · Feeling out of control  · Feeling that you may try to harm yourself  · family or friends express concern over your behavior and ask you to get help  Date Last Reviewed: 9/29/2015  © 4219-9082 [employer]. [address] [place] [address]. All rights reserved. This information is not intended as a substitute for professional medical care. Always follow your healthcare professional's instructions.        Your Bodys Response to Anxiety    Normal anxiety is part of the bodys natural defense system. It's an alert to a threat that is unknown, vague, or comes from your own internal fears. While youre in this state, your feelings can range from a vague sense of worry to physical sensations such as a pounding heartbeat. These feelings make you want to react to the threat. An anxiety response is normal in many situations. But when you have an anxiety disorder, the same response can occur at the wrong times.  Anxiety can be helpful  Normal anxiety is a signal from your brain that warns you of a threat and is a normal response to help you prevent something or decrease the bad effects of something you can't  "control. For example, anxiety is a normal response to situations that might damage your body, separate you from a loved one, or lose your job. The symptoms of anxiety can be physical and mental.  How does it feel?  At certain times, people with anxiety may have:  · Dizziness  · Muscle tension or pain  · Restlessness  · Sleeplessness  · Trouble concentrating  · Racing heartbeat  · Fast breathing  · Shaking or trembling  · Stomachache  · Diarrhea  · Loss of energy  · Sweating  · Cold, clammy hands  · Chest pain  · Dry mouth  Anxiety can also be a problem  Anxiety can become a problem when it is hard to control, occurs for months, and interferes with important parts of your life. With an anxiety disorder, your body has the response described above, but in inappropriate ways. The response a person has depends on the anxiety disorder he or she has. With some disorders, the anxiety is way out of proportion to the threat that triggers it. With others, anxiety may occur even when there isnt a clear threat or trigger.  Who does it affect?  Some people are more prone to persistent anxiety than others. It tends to run in families, and it affects more younger people than older people, and more women than men. But no age, race, or gender is immune to anxiety problems.  Anxiety can be treated  The good news is that the anxiety thats disrupting your life can be treated. Check with your healthcare provider and rule out any physical problems that may be causing the anxiety symptoms. If an anxiety disorder is diagnosed seek mental healthcare. This is an illness and it can respond to treatment. Most types of anxiety disorders will respond to "talk therapy" and medicines. Working with your doctor or other healthcare provider, you can develop skills to help you cope with anxiety. You can also gain the perspective you need to overcome your fears. Note: Good sources of support or guidance can be found at your local [facility], [facility]" health clinic, or an employee assistance program.  How to cope with anxiety  If anxiety is wearing you down, here are some things you can do to cope:  · Keep in mind that you cant control everything about a situation. Change what you can and let the rest take its course.  · Exercise--its a great way to relieve tension and help your body feel relaxed.  · Avoid caffeine and nicotine, which can make anxiety symptoms worse.  · Fight the temptation to turn to alcohol or unprescribed drugs for relief. They only make things worse in the long run.  · Educate yourself about anxiety disorders. Keep track of helpful online resources and books you can use during stressful periods.  · Try stress management techniques such as meditation.  · Consider online or in-person support groups.   Date Last Reviewed: 1/1/2017  © 0891-8224 [employer]. [address] [place] [address], [place] [address]. All rights reserved. This information is not intended as a substitute for professional medical care. Always follow your healthcare professional's instructions.

## 2020-07-21 NOTE — PROGRESS NOTES
Psychiatry Initial Visit (PhD/LCSW)  Diagnostic Interview - CPT 66905    Date: 2020    Site: [facility]    Referral source: Pt's PCP    Clinical status of patient: Outpatient    Mercedez Soni, a 45 y.o. female, for initial evaluation visit.  Met with patient.    Chief complaint/reason for encounter: grief, anxiety, trauma from watching spouse pass at home    History of present illness: Pt is a 44 yo female who presents today for her first visit with LCSW. Pt whishes to establish psychotherapy in order to help cope with grief and trauma from recently losing her spouse, as well as anxiety. Pt reports lost spouse in March of this year when he suddenly  of a heart attack in their bedroom. Pt states spouse's health was bad, however did not expect to lose him so soon or at home. Pt states is having difficulty coping as she has 2 children ages 12 and 13 and is now looking to move near her grandmother. Pt reports does not work and family has been proving funds until pt receives spouse's provider benefits next month. Pt tearful during session. Pt reports unable to sleep due to continue replaying of what happened throughout the night. Pt also states that she has flashbacks when the clock hits the same time spouse passed. Pt currently sleeping in son's room and cannot bring herself to go back in there. Pt states was previously having issues with anxiety a few months prior to when spouse passed and states since then it has gotten worse. Pt's PCP prescribed Paxil and Trazodone, however states neither helps. Pt states is planning to message PCP in order to discuss change and verbalized understating not to discontinue prescribed medications until MD provides steps on how to do safely. Pt denies any recent, past, or current feelings of SI/HI. Supportive and interactive psychotherapy provided as well as education on grieving process. Pt receptive to psychotherapy. Wishes to return in 1 week       Pain:  noncontributory    Symptoms:   · Mood: tearfulness  · Anxiety: excessive anxiety/worry  · Substance abuse: denied  · Cognitive functioning: good  · Health behaviors: noncontributory    Psychiatric history: psychotropic management by PCP    Medical history: noncontributory    Family history of psychiatric illness: Pt did not divulge    Social history (marriage, employment, etc.):   -Recently  in March 2020  -2 children ages 13 and 12  -supportive family network of mother, grandmother, sister, niece, and cousin  -career of housewife    Substance use:   Alcohol: occasional   Drugs: none   Tobacco: 1/2 ppd   Caffeine: none    Current medications and drug reactions (include OTC, herbal): see medication list     Strengths and liabilities: Strength: Patient accepts guidance/feedback, Strength: Patient is expressive/articulate., Strength: Patient is intelligent., Strength: Patient has positive support network., Strength: Patient has reasonable judgment.    Current Evaluation:     Mental Status Exam:  General Appearance:  unremarkable, age appropriate   Speech: normal tone, normal rate, normal pitch, normal volume      Level of Cooperation: cooperative      Thought Processes: normal and logical   Mood: sad      Thought Content: normal, no suicidality, no homicidality, delusions, or paranoia   Affect: congruent and appropriate   Orientation: Oriented x3   Memory: recent >  intact, remote >  intact   Attention Span & Concentration: intact   Fund of General Knowledge: intact and appropriate to age and level of education   Abstract Reasoning: interpretation of similarities was concrete, interpretation of proverbs was abstract   Judgment & Insight: good     Language  intact     Diagnostic Impression - Plan:       ICD-10-CM ICD-9-CM   1. Grieving  F43.21 309.0   2. Acute stress disorder  F43.0 308.3   3. Anxiety, generalized  F41.1 300.02       Plan:individual psychotherapy and medication management by physician    Return  to Clinic: 1 week    Length of Service (minutes): 60

## 2020-07-24 ENCOUNTER — SSC ENCOUNTER (OUTPATIENT)
Dept: ADMINISTRATIVE | Facility: OTHER | Age: 45
End: 2020-07-24

## 2020-07-24 NOTE — PROGRESS NOTES
Please note the following patient's information has been forwarded to the [employer] case management office on 7/24/20.    Please contact Outpatient Care Management at Ext. [phone] with questions.    Thank you,    Lindsay Mackay, [facility] [place], [place]  Outpatient Case Mgmnt  [phone]

## 2020-07-29 PROBLEM — E55.9 VITAMIN D DEFICIENCY: Status: ACTIVE | Noted: 2020-07-29

## 2020-08-30 NOTE — TELEPHONE ENCOUNTER
Returned pt call and scheduled consult to discuss surgery dates 6/5/2018. Pt verbalized understanding   Constitutional: See HPI.  Eyes: No visual changes, eye pain or discharge. No Photophobia  ENMT: No hearing changes, pain, discharge or infections. No neck pain or stiffness. No limited ROM  Cardiac: No SOB or edema. No chest pain with exertion.  Respiratory: No cough or respiratory distress.   GI: No nausea, vomiting, diarrhea or abdominal pain.  : No dysuria, frequency or burning. No Discharge  MS: No myalgia, muscle weakness, joint pain or back pain.  Neuro: see hpi   Skin: No skin rash.  Except as documented in the HPI, all other systems are negative.

## 2021-04-26 ENCOUNTER — PATIENT MESSAGE (OUTPATIENT)
Dept: RESEARCH | Facility: HOSPITAL | Age: 46
End: 2021-04-26

## 2021-11-19 ENCOUNTER — TELEPHONE (OUTPATIENT)
Dept: SPORTS MEDICINE | Facility: CLINIC | Age: 46
End: 2021-11-19
Payer: MEDICAID

## 2023-04-12 PROBLEM — F32.A ANXIETY AND DEPRESSION: Status: RESOLVED | Noted: 2018-11-26 | Resolved: 2023-04-12

## 2023-04-12 PROBLEM — F43.21 GRIEVING: Status: RESOLVED | Noted: 2020-07-08 | Resolved: 2023-04-12

## 2023-04-12 PROBLEM — F41.9 ANXIETY AND DEPRESSION: Status: RESOLVED | Noted: 2018-11-26 | Resolved: 2023-04-12

## 2023-07-10 PROBLEM — M54.10 RADICULOPATHY: Status: ACTIVE | Noted: 2023-07-10

## 2023-07-10 PROBLEM — I10 HYPERTENSION: Status: ACTIVE | Noted: 2023-07-10

## 2023-11-13 ENCOUNTER — TELEPHONE (OUTPATIENT)
Dept: SURGERY | Facility: CLINIC | Age: 48
End: 2023-11-13
Payer: MEDICAID

## 2023-11-13 NOTE — TELEPHONE ENCOUNTER
Called patient reference to a referral to  Ambulatory Colorectal Surgery for colon cancer screening. The patient's voice mailbox is full.

## 2023-11-15 ENCOUNTER — TELEPHONE (OUTPATIENT)
Dept: SURGERY | Facility: CLINIC | Age: 48
End: 2023-11-15
Payer: MEDICAID

## 2023-11-15 DIAGNOSIS — Z12.11 SCREENING FOR COLON CANCER: Primary | ICD-10-CM

## 2023-11-15 RX ORDER — SODIUM CHLORIDE 0.9 % (FLUSH) 0.9 %
3 SYRINGE (ML) INJECTION
Status: CANCELLED | OUTPATIENT
Start: 2023-11-15

## 2023-11-15 RX ORDER — SODIUM, POTASSIUM,MAG SULFATES 17.5-3.13G
1 SOLUTION, RECONSTITUTED, ORAL ORAL DAILY
Qty: 1 KIT | Refills: 0 | Status: SHIPPED | OUTPATIENT
Start: 2023-11-15 | End: 2023-11-17

## 2023-11-15 NOTE — TELEPHONE ENCOUNTER
The patient has been advised the Colonoscopy Prep Kit will be ordered from the patient's verified preferred pharmacy on file. The medication can  be picked up by the patient, or the patient's designated representative.The patient was further explained the Colonoscopy Prep instructions will be mailed to the patient verified mailing address on file, or put onto the [employer] portal, whichever method of delivery the patient prefers.Additionally this patient was informed,not to follow the instructions that comes with the bowel prep medication. However, the patient was instructed to please follow the Colonoscopy Bowel Prep instructions that's being provided by the . The patient was asked to please to follow the Colonoscopy Prep instructions being provided as precisely,and  meticulously as possible.The patient was advised you  will receive a follow up phone call to summarize the Colonoscopy Prep instructions prior to the scheduled Colonoscopy procedure date. At this time the patient will be given an opportunity to ask any questions regarding the Colonoscopy procedure, and it's associated Bowel Prep instructions.

## 2023-11-15 NOTE — TELEPHONE ENCOUNTER
Called patient in reference to a referral to Colorectal Surgery for colon cancer screening. Patient verbally consented to a Colonoscopy and requested to be scheduled for a Colonoscopy on 11/30/2023 Patient was advised a designated  is required on the day of the Colonoscopy to drive the patient home and the  must be at least. 18 years old.Colonoscopy Prep instructions were thoroughly explained and discussed with the patient.It was emphasized, and reiterated to the patient, to please not to follow the bowel prep instructions that comes with the bowel prep package.However, to please follow the prep instructions that will be received in the mail,or via the [employer] portal, or by both modes of delivery, which ever method of delivery the patient prefers,from the [facility]   Patient acknowledges understanding Prep instructions as explained and discussed on the phone.. Patient was advised the Colonoscopy Prep instructions discussed and explained on the phone,are being mailed out to the patient's verified address on file,or put onto the [employer] portal,or both methods of delivery, whichever the patient prefers. Patient's address on file was verified with the patient for accuracy of mailing. Patient's medications on file was reviewed with the patient for accuracy of information. Patient denies taking  any other medications other than those listed and verified on medication profile.Patient was explained the Colonoscopy will be performed here at [facility]. Patient was further explained the Pre-Op will call one day prior to the procedure date, to discuss Pre-Op instructions;and what time to report for the Colonoscopy. The patient was given the opportunity to ask any questions about the Colonoscopy. No further issues were discussed.

## 2023-11-16 ENCOUNTER — HOSPITAL ENCOUNTER (OUTPATIENT)
Dept: RADIOLOGY | Facility: HOSPITAL | Age: 48
Discharge: HOME | End: 2023-11-16
Attending: PHYSICAL MEDICINE & REHABILITATION
Payer: MEDICAID

## 2023-11-16 DIAGNOSIS — Z12.31 ENCOUNTER FOR SCREENING MAMMOGRAM FOR MALIGNANT NEOPLASM OF BREAST: ICD-10-CM

## 2023-11-16 PROCEDURE — 77067 SCR MAMMO BI INCL CAD: CPT | Mod: 26,,, | Performed by: RADIOLOGY

## 2023-11-16 PROCEDURE — 77063 MAMMO DIGITAL SCREENING BILAT WITH TOMO: ICD-10-PCS | Mod: 26,,, | Performed by: RADIOLOGY

## 2023-11-16 PROCEDURE — 77067 SCR MAMMO BI INCL CAD: CPT | Mod: TC

## 2023-11-16 PROCEDURE — 77063 BREAST TOMOSYNTHESIS BI: CPT | Mod: 26,,, | Performed by: RADIOLOGY

## 2023-11-16 PROCEDURE — 77067 MAMMO DIGITAL SCREENING BILAT WITH TOMO: ICD-10-PCS | Mod: 26,,, | Performed by: RADIOLOGY

## 2024-05-15 ENCOUNTER — TELEPHONE (OUTPATIENT)
Dept: SURGERY | Facility: CLINIC | Age: 49
End: 2024-05-15
Payer: MEDICAID

## 2024-05-15 NOTE — TELEPHONE ENCOUNTER
A call was placed to this re: a referral received at Colorectal Surgery to schedule this patient for colon cancer screening by means of a Colonoscopy. The patient did not answer,mailbox is full, not able to leave the patient a message.

## 2024-10-14 ENCOUNTER — TELEPHONE (OUTPATIENT)
Dept: GASTROENTEROLOGY | Facility: CLINIC | Age: 49
End: 2024-10-14
Payer: MEDICAID

## 2024-10-14 DIAGNOSIS — Z12.11 COLON CANCER SCREENING: Primary | ICD-10-CM

## 2024-10-14 RX ORDER — ARIPIPRAZOLE 2 MG/1
TABLET ORAL
COMMUNITY
Start: 2024-09-16

## 2024-10-14 RX ORDER — SODIUM, POTASSIUM,MAG SULFATES 17.5-3.13G
1 SOLUTION, RECONSTITUTED, ORAL ORAL DAILY
Qty: 1 KIT | Refills: 0 | Status: CANCELLED | OUTPATIENT
Start: 2024-10-14 | End: 2024-10-16

## 2024-10-14 RX ORDER — MIRTAZAPINE 7.5 MG/1
TABLET, FILM COATED ORAL
COMMUNITY
Start: 2024-09-16

## 2024-10-14 RX ORDER — BUSPIRONE HYDROCHLORIDE 7.5 MG/1
TABLET ORAL
COMMUNITY
Start: 2024-09-16

## 2024-10-15 RX ORDER — SODIUM, POTASSIUM,MAG SULFATES 17.5-3.13G
1 SOLUTION, RECONSTITUTED, ORAL ORAL DAILY
Qty: 1 KIT | Refills: 0 | Status: SHIPPED | OUTPATIENT
Start: 2024-10-15 | End: 2024-10-17

## 2024-11-04 ENCOUNTER — TELEPHONE (OUTPATIENT)
Dept: GASTROENTEROLOGY | Facility: CLINIC | Age: 49
End: 2024-11-04
Payer: MEDICAID

## 2024-11-04 NOTE — TELEPHONE ENCOUNTER
Patient notified and gave verbal understanding    ----- Message from Jonny Gil MD sent at 11/4/2024  7:59 AM CST -----  Pathology from recent colonoscopy reviewed.  Colon polyp(s) benign.  Repeat colonoscopy in 5 years.

## 2025-07-10 PROBLEM — Z86.0100 S/P COLON POLYPECTOMY: Chronic | Status: ACTIVE | Noted: 2025-07-10

## 2025-07-10 PROBLEM — Z98.890 S/P COLON POLYPECTOMY: Chronic | Status: ACTIVE | Noted: 2025-07-10

## 2025-07-28 ENCOUNTER — OFFICE VISIT (OUTPATIENT)
Dept: OPTOMETRY | Facility: CLINIC | Age: 50
End: 2025-07-28
Payer: MEDICAID

## 2025-07-28 ENCOUNTER — TELEPHONE (OUTPATIENT)
Dept: OPHTHALMOLOGY | Facility: CLINIC | Age: 50
End: 2025-07-28
Payer: MEDICAID

## 2025-07-28 DIAGNOSIS — H04.123 DRY EYE SYNDROME OF BOTH EYES: ICD-10-CM

## 2025-07-28 DIAGNOSIS — H40.1134 PRIMARY OPEN ANGLE GLAUCOMA (POAG) OF BOTH EYES, INDETERMINATE STAGE: Primary | ICD-10-CM

## 2025-07-28 DIAGNOSIS — I10 ESSENTIAL HYPERTENSION: ICD-10-CM

## 2025-07-28 DIAGNOSIS — H52.7 REFRACTIVE ERROR: ICD-10-CM

## 2025-07-28 PROCEDURE — 99212 OFFICE O/P EST SF 10 MIN: CPT | Mod: PBBFAC | Performed by: OPTOMETRIST

## 2025-07-28 PROCEDURE — 92004 COMPRE OPH EXAM NEW PT 1/>: CPT | Mod: S$PBB,,, | Performed by: OPTOMETRIST

## 2025-07-28 PROCEDURE — 92015 DETERMINE REFRACTIVE STATE: CPT | Mod: ,,, | Performed by: OPTOMETRIST

## 2025-07-28 PROCEDURE — 3061F NEG MICROALBUMINURIA REV: CPT | Mod: CPTII,,, | Performed by: OPTOMETRIST

## 2025-07-28 PROCEDURE — 99999 PR PBB SHADOW E&M-EST. PATIENT-LVL II: CPT | Mod: PBBFAC,,, | Performed by: OPTOMETRIST

## 2025-07-28 PROCEDURE — 3066F NEPHROPATHY DOC TX: CPT | Mod: CPTII,,, | Performed by: OPTOMETRIST

## 2025-07-28 PROCEDURE — 1159F MED LIST DOCD IN RCRD: CPT | Mod: CPTII,,, | Performed by: OPTOMETRIST

## 2025-07-28 PROCEDURE — 3044F HG A1C LEVEL LT 7.0%: CPT | Mod: CPTII,,, | Performed by: OPTOMETRIST

## 2025-07-29 NOTE — PROGRESS NOTES
HPI    ABEL: N/A    Last DFE: N/A    Chief complaint (CC):  New Patient , 50 year old female presents today for   an annual eye exam , patient states she has noticed some blurry vision at   a distance and near vision for a few years , patient also states that she   has been having tearing and itching feeling like something is in them, as   well as she noticed her eye are a bit glossy   Glasses? Yes, readers but does not remember the strength   Contacts? no  H/o eye surgery, injections or laser: no  H/o eye injury: no  Known eye conditions? no  Family h/o eye conditions? no  Eye gtts? Aluminar ?      (-) Flashes (+)  Floaters, longstanding (+) Mucous , sometimes   (+)  Tearing (+) Itching (-) Burning   (-) Headaches (+) Eye Pain/discomfort ,yes but not consistent (+)   Irritation   (-)  Redness (-) Double vision (+) Blurry vision    CL Exam: No    Diabetic? no  A1c? Lab Results       Component                Value               Date                       HGBA1C                   5.7 (H)             07/10/2025              Last edited by Cherise Ng, OD on 7/29/2025  1:28 PM.            Assessment /Plan     For exam results, see Encounter Report.    Primary open angle glaucoma (POAG) of both eyes, indeterminate stage    Essential hypertension  -     Ambulatory referral/consult to Ophthalmology    Dry eye syndrome of both eyes    Refractive error      Educated pt on findings. High IOP OU. Pt would like to stay at [facility] location --- I will no longer be at [facility]. Will have pt f/u with Dr. Benson for RNFL Oct / 24-2 HVF / IOP check. Continue management / care as directed.     2. No retinopathy noted. Continue BP control with medication and PCP f/u as directed.     3. Educated pt on findings. Recommended ATs TID-QID + madeline/gel QHS for added lubrication and comfort. If symptoms worsen or dont improve, RTC. Monitor.      4. Updated SRx. Monitor yearly.        RTC with Dr. Benson as directed, me prn.